# Patient Record
Sex: MALE | Race: WHITE | NOT HISPANIC OR LATINO | Employment: OTHER | URBAN - METROPOLITAN AREA
[De-identification: names, ages, dates, MRNs, and addresses within clinical notes are randomized per-mention and may not be internally consistent; named-entity substitution may affect disease eponyms.]

---

## 2017-02-10 ENCOUNTER — LAB (OUTPATIENT)
Dept: LAB | Facility: HOSPITAL | Age: 62
End: 2017-02-10

## 2017-02-10 DIAGNOSIS — E78.2 MIXED HYPERLIPIDEMIA: ICD-10-CM

## 2017-02-10 DIAGNOSIS — E11.8 DIABETIC COMPLICATION (HCC): Primary | ICD-10-CM

## 2017-02-10 DIAGNOSIS — I10 ESSENTIAL HYPERTENSION, MALIGNANT: ICD-10-CM

## 2017-02-10 LAB
ALBUMIN SERPL-MCNC: 4.2 G/DL (ref 3.2–4.8)
ALBUMIN/GLOB SERPL: 1.8 G/DL (ref 1.5–2.5)
ALP SERPL-CCNC: 70 U/L (ref 25–100)
ALT SERPL W P-5'-P-CCNC: 31 U/L (ref 7–40)
ANION GAP SERPL CALCULATED.3IONS-SCNC: 9 MMOL/L (ref 3–11)
ARTICHOKE IGE QN: 59 MG/DL (ref 0–130)
AST SERPL-CCNC: 26 U/L (ref 0–33)
BILIRUB SERPL-MCNC: 0.5 MG/DL (ref 0.3–1.2)
BUN BLD-MCNC: 15 MG/DL (ref 9–23)
BUN/CREAT SERPL: 18.8 (ref 7–25)
CALCIUM SPEC-SCNC: 9.4 MG/DL (ref 8.7–10.4)
CHLORIDE SERPL-SCNC: 104 MMOL/L (ref 99–109)
CHOLEST SERPL-MCNC: 166 MG/DL (ref 0–200)
CO2 SERPL-SCNC: 26 MMOL/L (ref 20–31)
CREAT BLD-MCNC: 0.8 MG/DL (ref 0.6–1.3)
GFR SERPL CREATININE-BSD FRML MDRD: 98 ML/MIN/1.73
GLOBULIN UR ELPH-MCNC: 2.3 GM/DL
GLUCOSE BLD-MCNC: 154 MG/DL (ref 70–100)
HBA1C MFR BLD: 7.4 % (ref 4.8–5.6)
HDLC SERPL-MCNC: 28 MG/DL (ref 40–60)
POTASSIUM BLD-SCNC: 4.5 MMOL/L (ref 3.5–5.5)
PROT SERPL-MCNC: 6.5 G/DL (ref 5.7–8.2)
SODIUM BLD-SCNC: 139 MMOL/L (ref 132–146)
TRIGL SERPL-MCNC: 334 MG/DL (ref 0–150)

## 2017-02-10 PROCEDURE — 80053 COMPREHEN METABOLIC PANEL: CPT | Performed by: INTERNAL MEDICINE

## 2017-02-10 PROCEDURE — 83036 HEMOGLOBIN GLYCOSYLATED A1C: CPT | Performed by: INTERNAL MEDICINE

## 2017-02-10 PROCEDURE — 36415 COLL VENOUS BLD VENIPUNCTURE: CPT

## 2017-02-10 PROCEDURE — 80061 LIPID PANEL: CPT | Performed by: INTERNAL MEDICINE

## 2019-05-31 ENCOUNTER — LAB REQUISITION (OUTPATIENT)
Dept: LAB | Facility: HOSPITAL | Age: 64
End: 2019-05-31

## 2019-05-31 ENCOUNTER — OFFICE VISIT (OUTPATIENT)
Dept: NEUROLOGY | Facility: CLINIC | Age: 64
End: 2019-05-31

## 2019-05-31 VITALS
BODY MASS INDEX: 41.75 KG/M2 | HEIGHT: 73 IN | WEIGHT: 315 LBS | OXYGEN SATURATION: 98 % | HEART RATE: 70 BPM | DIASTOLIC BLOOD PRESSURE: 80 MMHG | SYSTOLIC BLOOD PRESSURE: 140 MMHG

## 2019-05-31 DIAGNOSIS — R20.2 PARESTHESIA OF SKIN: ICD-10-CM

## 2019-05-31 DIAGNOSIS — R20.2 NUMBNESS AND TINGLING IN BOTH HANDS: Primary | ICD-10-CM

## 2019-05-31 DIAGNOSIS — R20.0 ANESTHESIA OF SKIN: ICD-10-CM

## 2019-05-31 DIAGNOSIS — R20.0 NUMBNESS AND TINGLING IN BOTH HANDS: Primary | ICD-10-CM

## 2019-05-31 PROCEDURE — 36415 COLL VENOUS BLD VENIPUNCTURE: CPT | Performed by: NURSE PRACTITIONER

## 2019-05-31 PROCEDURE — 99203 OFFICE O/P NEW LOW 30 MIN: CPT | Performed by: NURSE PRACTITIONER

## 2019-05-31 RX ORDER — ASPIRIN 81 MG/1
81 TABLET ORAL DAILY
COMMUNITY
End: 2021-03-01

## 2019-05-31 RX ORDER — LISINOPRIL 5 MG/1
5 TABLET ORAL DAILY
Refills: 2 | COMMUNITY
Start: 2019-04-03

## 2019-05-31 RX ORDER — ALLOPURINOL 300 MG/1
300 TABLET ORAL DAILY
Refills: 0 | COMMUNITY
Start: 2019-04-03

## 2019-05-31 RX ORDER — PIOGLITAZONEHYDROCHLORIDE 30 MG/1
30 TABLET ORAL DAILY
Refills: 2 | Status: ON HOLD | COMMUNITY
Start: 2019-04-03 | End: 2021-04-28

## 2019-05-31 RX ORDER — BISOPROLOL FUMARATE 5 MG/1
5 TABLET, FILM COATED ORAL DAILY
Refills: 2 | COMMUNITY
Start: 2019-04-03

## 2019-05-31 RX ORDER — GLYBURIDE 5 MG/1
10 TABLET ORAL 2 TIMES DAILY
Refills: 1 | Status: ON HOLD | COMMUNITY
Start: 2019-03-18 | End: 2021-04-28

## 2019-05-31 RX ORDER — INSULIN ASPART 100 [IU]/ML
INJECTION, SOLUTION INTRAVENOUS; SUBCUTANEOUS
Refills: 2 | COMMUNITY
Start: 2019-04-03

## 2019-05-31 RX ORDER — GABAPENTIN 100 MG/1
CAPSULE ORAL
Qty: 90 CAPSULE | Refills: 2 | Status: SHIPPED | OUTPATIENT
Start: 2019-05-31 | End: 2019-08-16

## 2019-05-31 RX ORDER — INSULIN DETEMIR 100 [IU]/ML
INJECTION, SOLUTION SUBCUTANEOUS 2 TIMES DAILY
Refills: 2 | COMMUNITY
Start: 2019-04-02

## 2019-05-31 NOTE — PROGRESS NOTES
Subjective:     Patient ID: Agusto Cullen is a 63 y.o. male.    CC:   Chief Complaint   Patient presents with   • Peripheral Neuropathy       HPI:   History of Present Illness   Is a very pleasant 63-year-old male who presents for initial neurological evaluation of bilateral hand pain, numbness, tingling and burning sensation over the past 12 months.  He tells me that his hands burn pretty much constantly with a numb and tingling sensation and this worsens with extra usage of the hands.  He tells me that it hurts more when he is squeezing his fishing remington and its increased in the right versus the left but it is both hands.  He is right-hand dominant.  He tells me that sometimes his pain will be severe throughout the night and he will have to get up and walk due to the discomfort.  He tells me that he has started wearing white socks on his hands and holding a bar of soap in each hand at night since his wife likes to do homeopathic remedies and he says this has actually helped.  He is also taking a nerve for new capsule at B1, B12 and alpha lipoic acid 2 times a day.  He is also using a frankincense and myrrh essential oil to put on the hands for neuropathy with some immediate relief but it does not sustain him.  He tells me he is moderately to severely uncomfortable at times.  He would like additional testing as well as something to take for the discomfort.  He has been a diabetic for 25 years.  Most recent hemoglobin A1c is 5.6%. Has gained about 100lbs since being on insulin. Trying to exercise but notices hypoglycemia with any significant physical exertion. He is currently on Levemir, NovoLog, pioglitazone and glyburide.  He has previously had uncontrolled diabetes but has been better controlled in the last few years.  He does feel like he is losing some fine motor control in his hands.  His numbness and tingling is mostly in the first 3 fingers of both hands.  He does have a history of gout but this is typically  in his right ankle and has not been an issue with maintenance of allopurinol.  He did have a recent CBC with differential and CMP as well as lipid panel and these essentially look within normal range with a mild elevation in triglycerides which has significantly improved over time per patient. Denies any paresthesias of legs or feet.    The following portions of the patient's history were reviewed and updated as appropriate: allergies, current medications, past family history, past medical history, past social history, past surgical history and problem list.    Past Medical History:   Diagnosis Date   • Diabetes mellitus (CMS/HCC)    • Gout    • Hyperlipidemia    • Hypertension    • Sleep apnea    • Vision loss        Past Surgical History:   Procedure Laterality Date   • CAROTID STENT     • CHOLECYSTECTOMY     • MANDIBLE SURGERY         Social History     Socioeconomic History   • Marital status:      Spouse name: Not on file   • Number of children: Not on file   • Years of education: Not on file   • Highest education level: Not on file   Tobacco Use   • Smoking status: Unknown If Ever Smoked   Substance and Sexual Activity   • Alcohol use: Defer   • Drug use: Defer   • Sexual activity: Defer       Family History   Problem Relation Age of Onset   • Diabetes Mother    • Cancer Mother    • Heart disease Brother    • Dementia Maternal Uncle    • Heart disease Maternal Grandfather         Review of Systems   Constitutional: Negative for chills, fatigue, fever and unexpected weight change.   HENT: Negative for ear pain, hearing loss, nosebleeds, rhinorrhea and sore throat.    Eyes: Negative for photophobia, pain, discharge, itching and visual disturbance.   Respiratory: Negative for cough, chest tightness, shortness of breath and wheezing.    Cardiovascular: Negative for chest pain, palpitations and leg swelling.   Gastrointestinal: Negative for abdominal pain, blood in stool, constipation, diarrhea, nausea and  vomiting.   Genitourinary: Negative for dysuria, frequency, hematuria and urgency.   Musculoskeletal: Negative for arthralgias, back pain, gait problem, joint swelling, myalgias, neck pain and neck stiffness.   Skin: Negative for rash and wound.   Allergic/Immunologic: Negative for environmental allergies and food allergies.   Neurological: Positive for numbness (in hands). Negative for dizziness, tremors, seizures, syncope, speech difficulty, weakness, light-headedness and headaches.   Hematological: Negative for adenopathy. Does not bruise/bleed easily.   Psychiatric/Behavioral: Negative for agitation, confusion, decreased concentration, hallucinations, sleep disturbance and suicidal ideas. The patient is not nervous/anxious.         Objective:    Neurologic Exam     Mental Status   Oriented to person, place, and time.   Registration: recalls 3 of 3 objects. Recall at 5 minutes: recalls 3 of 3 objects. Follows 3 step commands.   Attention: normal. Concentration: normal.   Speech: speech is normal   Level of consciousness: alert  Knowledge: good and consistent with education. Able to perform simple calculations.   Able to name object. Able to read. Able to repeat. Able to write. Normal comprehension.     Cranial Nerves   Cranial nerves II through XII intact.     Motor Exam   Muscle bulk: normal  Overall muscle tone: normal    Strength   Strength 5/5 throughout.     Sensory Exam   Light touch normal.   Vibration normal.   Proprioception normal.   Pinprick normal.     Decreased glove sensation bilateral hands. Negative tinel's and phalen's bilateral wrists and elbows     Gait, Coordination, and Reflexes     Gait  Gait: normal    Coordination   Romberg: negative  Finger to nose coordination: normal  Heel to shin coordination: normal    Tremor   Resting tremor: absent  Intention tremor: absent  Action tremor: absent    Reflexes   Right brachioradialis: 2+  Left brachioradialis: 2+  Right biceps: 2+  Left biceps:  2+  Right triceps: 2+  Left triceps: 2+  Right patellar: 2+  Left patellar: 2+  Right achilles: 2+  Left achilles: 2+  Right : 2+  Left : 2+  Right plantar: normal  Left plantar: normal  Right Santos: absent  Left Santos: absent  Right ankle clonus: absent  Left ankle clonus: absent      Physical Exam   Constitutional: He is oriented to person, place, and time. He appears well-developed and well-nourished.   Eyes:   Fundoscopic exam:       The right eye shows red reflex.        The left eye shows red reflex.   Cardiovascular: Normal rate, regular rhythm, S1 normal, S2 normal and normal heart sounds.   Pulmonary/Chest: Effort normal and breath sounds normal.   Neurological: He is oriented to person, place, and time. He has normal strength. He has a normal Finger-Nose-Finger Test, a normal Heel to Braswell Test and a normal Romberg Test. Gait normal.   Reflex Scores:       Tricep reflexes are 2+ on the right side and 2+ on the left side.       Bicep reflexes are 2+ on the right side and 2+ on the left side.       Brachioradialis reflexes are 2+ on the right side and 2+ on the left side.       Patellar reflexes are 2+ on the right side and 2+ on the left side.       Achilles reflexes are 2+ on the right side and 2+ on the left side.  Skin: Skin is warm, dry and intact. No lesion and no rash noted.   Psychiatric: He has a normal mood and affect. His speech is normal and behavior is normal. Judgment and thought content normal. Cognition and memory are normal.       Assessment/Plan:       Agusto was seen today for peripheral neuropathy.    Diagnoses and all orders for this visit:    Numbness and tingling in both hands  -     EMG & Nerve Conduction Test; Future  -     Ambulatory Referral to Occupational Therapy  -     Vitamin B12 & Folate; Future  -     Methylmalonic Acid, Serum; Future  -     MIGUEL + PE; Future  -     CK Total & CKMB; Future  -     gabapentin (NEURONTIN) 100 MG capsule; 1 capsule in AM and 2 capsules  in PM         Labs today. EMG/NCVS-suspect diabetic neuropathy early versus CTS or combination although negative phalens and tinels on exam. Start low dose gabapentin for discomfort. F/u in 8 weeks for re-eval. Reviewed medications, potential side effects and signs and symptoms to report. Discussed risk versus benefits of treatment plan with patient and/or family-including medications, labs and radiology that may be ordered. Addressed questions and concerns during visit. Patient and/or family verbalized understanding and agree with plan.    As part of this patient's treatment plan I am prescribing controlled substances. The patient has been made aware of appropriate use of such medications, including potential risk of somnolence, limited ability to drive and/or work safely, and potential for dependence or overdose. It has also been made clear that these medications are for use by the patient only, without concomitant use of alcohol or other substances unless prescribed. Keep out of reach of children.  Jose report has been reviewed. If this is going to be prescribed long term, [facility] Controlled Substance Agreement Contract has also been read and signed by patient and myself.  oJse #[id number] reviewed.    During this visit the following were done:  Labs Reviewed [x]    Labs Ordered [x]    Radiology Reports Reviewed []    Radiology Ordered []    PCP Records Reviewed [x]    Referring Provider Records Reviewed [x]    ER Records Reviewed []    Hospital Records Reviewed []    History Obtained From Family []    Radiology Images Reviewed []    Other Reviewed [x]    Records Requested []      EMR Dragon/Transcription Disclaimer:  Much of this encounter note is an electronic transcription of spoken language to printed text. Electronic transcription of spoken language may permit erroneous words or phrases to be inadvertently transcribed. Although I have reviewed the note for such errors, some may still exist in this  documentation.      Ann Raymundo, APRN  5/31/2019

## 2019-06-05 ENCOUNTER — TELEPHONE (OUTPATIENT)
Dept: NEUROLOGY | Facility: CLINIC | Age: 64
End: 2019-06-05

## 2019-06-05 NOTE — TELEPHONE ENCOUNTER
----- Message from YE Delgado sent at 6/5/2019  8:03 AM EDT -----  Blood work looks excellent. Vitamin B12 a bit high, but otherwise everything looks fine. Please fax labs to PCP. Thanks, YE Diamond

## 2019-07-09 ENCOUNTER — HOSPITAL ENCOUNTER (OUTPATIENT)
Dept: NEUROLOGY | Facility: HOSPITAL | Age: 64
Discharge: HOME | End: 2019-07-09
Admitting: NURSE PRACTITIONER

## 2019-07-09 DIAGNOSIS — R20.2 NUMBNESS AND TINGLING IN BOTH HANDS: ICD-10-CM

## 2019-07-09 DIAGNOSIS — R20.0 NUMBNESS AND TINGLING IN BOTH HANDS: ICD-10-CM

## 2019-07-09 PROCEDURE — 95910 NRV CNDJ TEST 7-8 STUDIES: CPT

## 2019-07-09 PROCEDURE — 95886 MUSC TEST DONE W/N TEST COMP: CPT

## 2019-07-10 ENCOUNTER — TELEPHONE (OUTPATIENT)
Dept: NEUROLOGY | Facility: CLINIC | Age: 64
End: 2019-07-10

## 2019-07-10 NOTE — TELEPHONE ENCOUNTER
----- Message from YE Delgado sent at 7/10/2019  1:24 PM EDT -----  Please notify patient emg/ncvs of both arms shows severe carpal tunnel on the right side and moderate on the left side-recommend referral to orthopedic hand surgeon-would he like to be referred? If So I will place the orders. Thanks, YE Diamond

## 2019-07-10 NOTE — PROGRESS NOTES
Please notify patient emg/ncvs of both arms shows severe carpal tunnel on the right side and moderate on the left side-recommend referral to orthopedic hand surgeon-would he like to be referred? If So I will place the orders. Thanks, YE Diamond

## 2019-07-11 ENCOUNTER — TELEPHONE (OUTPATIENT)
Dept: NEUROLOGY | Facility: CLINIC | Age: 64
End: 2019-07-11

## 2019-07-11 DIAGNOSIS — G56.03 BILATERAL CARPAL TUNNEL SYNDROME: Primary | ICD-10-CM

## 2019-07-19 ENCOUNTER — TELEPHONE (OUTPATIENT)
Dept: NEUROLOGY | Facility: CLINIC | Age: 64
End: 2019-07-19

## 2019-07-19 NOTE — TELEPHONE ENCOUNTER
Pt called and said he had his EMG and results were carpel tunnel and his is now scheduled on 7- with Orthopedic surgery and wanted to push his appt back so I rescheduled him and he also wanted to know if he should stop taking the gabapentin because he can't tell it has helped at all?

## 2019-07-19 NOTE — TELEPHONE ENCOUNTER
Yes, he can lower his gabapentin to 2 pills a day for a week and then 1 pill a day and then completely stop and keep appt with ortho. Thanks.

## 2019-08-16 ENCOUNTER — OFFICE VISIT (OUTPATIENT)
Dept: NEUROLOGY | Facility: CLINIC | Age: 64
End: 2019-08-16

## 2019-08-16 VITALS
DIASTOLIC BLOOD PRESSURE: 80 MMHG | HEIGHT: 73 IN | SYSTOLIC BLOOD PRESSURE: 138 MMHG | BODY MASS INDEX: 41.75 KG/M2 | HEART RATE: 80 BPM | OXYGEN SATURATION: 99 % | WEIGHT: 315 LBS

## 2019-08-16 DIAGNOSIS — G56.03 BILATERAL CARPAL TUNNEL SYNDROME: Primary | ICD-10-CM

## 2019-08-16 DIAGNOSIS — G56.23 ULNAR NEUROPATHY OF BOTH UPPER EXTREMITIES: ICD-10-CM

## 2019-08-16 PROCEDURE — 99213 OFFICE O/P EST LOW 20 MIN: CPT | Performed by: NURSE PRACTITIONER

## 2019-08-16 RX ORDER — MULTIVITAMIN WITH IRON
100 TABLET ORAL DAILY
COMMUNITY

## 2019-08-16 NOTE — PROGRESS NOTES
Subjective:     Patient ID: Agusto Cullen is a 64 y.o. male.    CC:   Chief Complaint   Patient presents with   • Carpal Tunnel       HPI:   History of Present Illness   This is a very pleasant 64-year-old male who presents for 2-1/2-month follow-up on confirmed bilateral carpal tunnel syndrome as well as ulnar neuropathy.  Last visit he had significant numbness and tingling in the first 3 fingers of both hands and also had a constant numbness and tingling sensation and burning for the past 12 months or so.  He had tried multiple homeopathic remedies as well as supplements over-the-counter with some mild relief.  He was moderately to severely uncomfortable at times and this was waking him up in the middle the night.  He has been diabetic for about 25 years.  Hemoglobin A1c recently 5.6%.  He did have an EMG and NCV as completed on 7/9/2019 and this confirmed a bilateral carpal tunnel syndrome severe on the right and moderate on the left as well as bilateral ulnar neuropathy of elbows moderate on the right and mild to moderate on the left.  He was evaluated by Sean Avitia PA-C with [facility] and was given injections a couple weeks ago and he tells me he has not seen much of a change yet.  He is wearing his splints.  He is considering surgical intervention once he has finished moving locally.  He did use gabapentin but did not feel like this was beneficial and so he has weaned off of this.  He does have some decreased strength in his hands.  Otherwise no new concerns.  He is planning on following up with orthopedics and just a few weeks..    The following portions of the patient's history were reviewed and updated as appropriate: allergies, current medications, past family history, past medical history, past social history, past surgical history and problem list.    Past Medical History:   Diagnosis Date   • Diabetes mellitus (CMS/[facility])    • Gout    • Hyperlipidemia    • Hypertension    •  Sleep apnea    • Vision loss        Past Surgical History:   Procedure Laterality Date   • CAROTID STENT     • CHOLECYSTECTOMY     • MANDIBLE SURGERY         Social History     Socioeconomic History   • Marital status:      Spouse name: Not on file   • Number of children: Not on file   • Years of education: Not on file   • Highest education level: Not on file   Tobacco Use   • Smoking status: Unknown If Ever Smoked   Substance and Sexual Activity   • Alcohol use: Defer   • Drug use: Defer   • Sexual activity: Defer       Family History   Problem Relation Age of Onset   • Diabetes Mother    • Cancer Mother    • Heart disease Brother    • Dementia Maternal Uncle    • Heart disease Maternal Grandfather         Review of Systems   Constitutional: Negative for chills, fatigue, fever and unexpected weight change.   HENT: Negative for ear pain, hearing loss, nosebleeds, rhinorrhea and sore throat.    Eyes: Negative for photophobia, pain, discharge, itching and visual disturbance.   Respiratory: Negative for cough, chest tightness, shortness of breath and wheezing.    Cardiovascular: Negative for chest pain, palpitations and leg swelling.   Gastrointestinal: Negative for abdominal pain, blood in stool, constipation, diarrhea, nausea and vomiting.   Genitourinary: Negative for dysuria, frequency, hematuria and urgency.   Musculoskeletal: Negative for arthralgias, back pain, gait problem, joint swelling, myalgias, neck pain and neck stiffness.   Skin: Negative for rash and wound.   Allergic/Immunologic: Negative for environmental allergies and food allergies.   Neurological: Positive for numbness. Negative for dizziness, tremors, seizures, syncope, speech difficulty, weakness, light-headedness and headaches.        Numbness in both hands   Hematological: Negative for adenopathy. Does not bruise/bleed easily.   Psychiatric/Behavioral: Negative for agitation, confusion, decreased concentration, hallucinations, sleep  disturbance and suicidal ideas. The patient is not nervous/anxious.    All other systems reviewed and are negative.       Objective:    Neurologic Exam     Mental Status   Oriented to person, place, and time.   Level of consciousness: alert    Cranial Nerves   Cranial nerves II through XII intact.     Motor Exam   Muscle bulk: normal  Overall muscle tone: normal    Strength   Strength 5/5 throughout.     Sensory Exam   Decreased glove sensation bilateral hands. Negative tinel's and phalen's bilateral wrists and elbows. wearing wrist splints today.     Gait, Coordination, and Reflexes     Gait  Gait: normal    Coordination   Finger to nose coordination: normal    Tremor   Resting tremor: absent  Intention tremor: absent  Action tremor: absent    Reflexes   Right brachioradialis: 1+  Left brachioradialis: 1+  Right biceps: 1+  Left biceps: 1+  Right triceps: 1+  Left triceps: 1+  Right : 1+  Left : 1+      Physical Exam   Constitutional: He is oriented to person, place, and time.   Neurological: He is oriented to person, place, and time. He has normal strength. He has a normal Finger-Nose-Finger Test. Gait normal.   Reflex Scores:       Tricep reflexes are 1+ on the right side and 1+ on the left side.       Bicep reflexes are 1+ on the right side and 1+ on the left side.       Brachioradialis reflexes are 1+ on the right side and 1+ on the left side.      Assessment/Plan:       Agusto was seen today for carpal tunnel.    Diagnoses and all orders for this visit:    Bilateral carpal tunnel syndrome  Comments:  continue with wrist splints. received injections. considering surgery with ortho. f/u with neurology PRN. off gabapentin-not helpful.    Ulnar neuropathy of both upper extremities  Comments:  continue with wrist splints. received injections. considering surgery with ortho. f/u with neurology PRN. off gabapentin-not helpful.         F/U with Neurology PRN. Reviewed [facility] notes/records.  Reviewed medications, potential side effects and signs and symptoms to report. Discussed risk versus benefits of treatment plan with patient and/or family-including medications, labs and radiology that may be ordered. Addressed questions and concerns during visit. Patient and/or family verbalized understanding and agree with plan.    During this visit the following were done:  Labs Reviewed [x] labs from last visit included vitamin B12 1659, folate greater than 20, methylmalonic acid 143, immunofixation and protein electrophoresis within normal limits and CK was CK-MB normal.  Labs Ordered []    Radiology Reports Reviewed []    Radiology Ordered []    PCP Records Reviewed []    Referring Provider Records Reviewed []    ER Records Reviewed []    Hospital Records Reviewed []    History Obtained From Family []    Radiology Images Reviewed []    Other Reviewed [x]    Records Requested []          Ann Raymundo, YE  8/16/2019

## 2021-02-25 PROBLEM — E78.5 HYPERLIPIDEMIA LDL GOAL <70: Status: ACTIVE | Noted: 2021-02-25

## 2021-02-25 PROBLEM — E66.01 MORBID OBESITY WITH BMI OF 45.0-49.9, ADULT (HCC): Status: ACTIVE | Noted: 2021-02-25

## 2021-02-25 PROBLEM — E11.65 TYPE 2 DIABETES MELLITUS WITH HYPERGLYCEMIA, WITHOUT LONG-TERM CURRENT USE OF INSULIN: Status: ACTIVE | Noted: 2021-02-25

## 2021-02-25 PROBLEM — I10 ESSENTIAL HYPERTENSION: Status: ACTIVE | Noted: 2021-02-25

## 2021-02-25 PROBLEM — G47.33 OSA (OBSTRUCTIVE SLEEP APNEA): Status: ACTIVE | Noted: 2021-02-25

## 2021-02-25 PROBLEM — I25.708 CORONARY ARTERY DISEASE OF BYPASS GRAFT OF NATIVE HEART WITH STABLE ANGINA PECTORIS (HCC): Status: ACTIVE | Noted: 2021-02-25

## 2021-03-01 ENCOUNTER — CONSULT (OUTPATIENT)
Dept: CARDIOLOGY | Facility: CLINIC | Age: 66
End: 2021-03-01

## 2021-03-01 VITALS
BODY MASS INDEX: 41.75 KG/M2 | SYSTOLIC BLOOD PRESSURE: 142 MMHG | HEIGHT: 73 IN | HEART RATE: 76 BPM | OXYGEN SATURATION: 98 % | DIASTOLIC BLOOD PRESSURE: 82 MMHG | WEIGHT: 315 LBS

## 2021-03-01 DIAGNOSIS — E11.65 TYPE 2 DIABETES MELLITUS WITH HYPERGLYCEMIA, WITHOUT LONG-TERM CURRENT USE OF INSULIN (HCC): ICD-10-CM

## 2021-03-01 DIAGNOSIS — I25.118 CORONARY ARTERY DISEASE OF NATIVE ARTERY OF NATIVE HEART WITH STABLE ANGINA PECTORIS (HCC): ICD-10-CM

## 2021-03-01 DIAGNOSIS — E66.01 MORBID OBESITY WITH BMI OF 45.0-49.9, ADULT (HCC): ICD-10-CM

## 2021-03-01 DIAGNOSIS — R06.09 DOE (DYSPNEA ON EXERTION): ICD-10-CM

## 2021-03-01 DIAGNOSIS — G47.33 OSA (OBSTRUCTIVE SLEEP APNEA): ICD-10-CM

## 2021-03-01 DIAGNOSIS — I25.708 CORONARY ARTERY DISEASE OF BYPASS GRAFT OF NATIVE HEART WITH STABLE ANGINA PECTORIS (HCC): Primary | ICD-10-CM

## 2021-03-01 DIAGNOSIS — I10 ESSENTIAL HYPERTENSION: ICD-10-CM

## 2021-03-01 DIAGNOSIS — E78.5 HYPERLIPIDEMIA LDL GOAL <70: ICD-10-CM

## 2021-03-01 PROCEDURE — 93000 ELECTROCARDIOGRAM COMPLETE: CPT | Performed by: INTERNAL MEDICINE

## 2021-03-01 PROCEDURE — 99204 OFFICE O/P NEW MOD 45 MIN: CPT | Performed by: INTERNAL MEDICINE

## 2021-03-01 RX ORDER — ATORVASTATIN CALCIUM 40 MG/1
40 TABLET, FILM COATED ORAL DAILY
Qty: 90 TABLET | Refills: 3 | Status: SHIPPED | OUTPATIENT
Start: 2021-03-01 | End: 2022-04-04

## 2021-03-01 RX ORDER — ASPIRIN 325 MG
325 TABLET, DELAYED RELEASE (ENTERIC COATED) ORAL DAILY
COMMUNITY

## 2021-03-05 ENCOUNTER — TELEPHONE (OUTPATIENT)
Dept: CARDIOLOGY | Facility: CLINIC | Age: 66
End: 2021-03-05

## 2021-03-05 DIAGNOSIS — R07.9 CHEST PAIN, UNSPECIFIED TYPE: ICD-10-CM

## 2021-03-05 DIAGNOSIS — I25.118 CORONARY ARTERY DISEASE OF NATIVE ARTERY OF NATIVE HEART WITH STABLE ANGINA PECTORIS (HCC): Primary | ICD-10-CM

## 2021-03-05 DIAGNOSIS — R06.02 SOB (SHORTNESS OF BREATH): ICD-10-CM

## 2021-04-16 ENCOUNTER — HOSPITAL ENCOUNTER (OUTPATIENT)
Dept: CARDIOLOGY | Facility: HOSPITAL | Age: 66
Discharge: HOME | End: 2021-04-16

## 2021-04-16 ENCOUNTER — HOSPITAL ENCOUNTER (OUTPATIENT)
Dept: CARDIOLOGY | Facility: HOSPITAL | Age: 66
Discharge: HOME | End: 2021-04-16
Admitting: INTERNAL MEDICINE

## 2021-04-16 VITALS
SYSTOLIC BLOOD PRESSURE: 171 MMHG | DIASTOLIC BLOOD PRESSURE: 84 MMHG | HEIGHT: 73 IN | WEIGHT: 315 LBS | BODY MASS INDEX: 41.75 KG/M2

## 2021-04-16 VITALS — HEART RATE: 67 BPM

## 2021-04-16 DIAGNOSIS — I25.118 CORONARY ARTERY DISEASE OF NATIVE ARTERY OF NATIVE HEART WITH STABLE ANGINA PECTORIS (HCC): ICD-10-CM

## 2021-04-16 DIAGNOSIS — I25.708 CORONARY ARTERY DISEASE OF BYPASS GRAFT OF NATIVE HEART WITH STABLE ANGINA PECTORIS (HCC): ICD-10-CM

## 2021-04-16 DIAGNOSIS — R06.02 SOB (SHORTNESS OF BREATH): ICD-10-CM

## 2021-04-16 DIAGNOSIS — R06.09 DOE (DYSPNEA ON EXERTION): ICD-10-CM

## 2021-04-16 DIAGNOSIS — R07.9 CHEST PAIN, UNSPECIFIED TYPE: ICD-10-CM

## 2021-04-16 LAB
BH CV ECHO MEAS - AO MAX PG (FULL): 6.5 MMHG
BH CV ECHO MEAS - AO MAX PG: 12.1 MMHG
BH CV ECHO MEAS - AO MEAN PG (FULL): 3 MMHG
BH CV ECHO MEAS - AO MEAN PG: 6 MMHG
BH CV ECHO MEAS - AO ROOT AREA (BSA CORRECTED): 1.2
BH CV ECHO MEAS - AO ROOT AREA: 9.1 CM^2
BH CV ECHO MEAS - AO ROOT DIAM: 3.4 CM
BH CV ECHO MEAS - AO V2 MAX: 174 CM/SEC
BH CV ECHO MEAS - AO V2 MEAN: 113 CM/SEC
BH CV ECHO MEAS - AO V2 VTI: 35.9 CM
BH CV ECHO MEAS - AVA(I,A): 2.2 CM^2
BH CV ECHO MEAS - AVA(I,D): 2.2 CM^2
BH CV ECHO MEAS - AVA(V,A): 2.1 CM^2
BH CV ECHO MEAS - AVA(V,D): 2.1 CM^2
BH CV ECHO MEAS - BSA(HAYCOCK): 2.9 M^2
BH CV ECHO MEAS - BSA: 2.7 M^2
BH CV ECHO MEAS - BZI_BMI: 45.9 KILOGRAMS/M^2
BH CV ECHO MEAS - BZI_METRIC_HEIGHT: 185.4 CM
BH CV ECHO MEAS - BZI_METRIC_WEIGHT: 157.9 KG
BH CV ECHO MEAS - EDV(CUBED): 51.1 ML
BH CV ECHO MEAS - EDV(MOD-SP2): 114 ML
BH CV ECHO MEAS - EDV(MOD-SP4): 179 ML
BH CV ECHO MEAS - EDV(TEICH): 58.5 ML
BH CV ECHO MEAS - EF(CUBED): 63.1 %
BH CV ECHO MEAS - EF(MOD-BP): 62 %
BH CV ECHO MEAS - EF(MOD-SP2): 62.3 %
BH CV ECHO MEAS - EF(MOD-SP4): 65.9 %
BH CV ECHO MEAS - EF(TEICH): 55.5 %
BH CV ECHO MEAS - ESV(CUBED): 18.8 ML
BH CV ECHO MEAS - ESV(MOD-SP2): 43 ML
BH CV ECHO MEAS - ESV(MOD-SP4): 61 ML
BH CV ECHO MEAS - ESV(TEICH): 26 ML
BH CV ECHO MEAS - FS: 28.3 %
BH CV ECHO MEAS - IVS/LVPW: 1
BH CV ECHO MEAS - IVSD: 1.3 CM
BH CV ECHO MEAS - LA DIMENSION: 4.2 CM
BH CV ECHO MEAS - LA/AO: 1.2
BH CV ECHO MEAS - LAD MAJOR: 5.3 CM
BH CV ECHO MEAS - LAT PEAK E' VEL: 10.5 CM/SEC
BH CV ECHO MEAS - LATERAL E/E' RATIO: 7.1
BH CV ECHO MEAS - LV DIASTOLIC VOL/BSA (35-75): 65.7 ML/M^2
BH CV ECHO MEAS - LV MASS(C)D: 168.1 GRAMS
BH CV ECHO MEAS - LV MASS(C)DI: 61.7 GRAMS/M^2
BH CV ECHO MEAS - LV MAX PG: 5.6 MMHG
BH CV ECHO MEAS - LV MEAN PG: 3 MMHG
BH CV ECHO MEAS - LV SYSTOLIC VOL/BSA (12-30): 22.4 ML/M^2
BH CV ECHO MEAS - LV V1 MAX: 118 CM/SEC
BH CV ECHO MEAS - LV V1 MEAN: 78.7 CM/SEC
BH CV ECHO MEAS - LV V1 VTI: 24.9 CM
BH CV ECHO MEAS - LVIDD: 3.7 CM
BH CV ECHO MEAS - LVIDS: 2.7 CM
BH CV ECHO MEAS - LVLD AP2: 7.7 CM
BH CV ECHO MEAS - LVLD AP4: 8.2 CM
BH CV ECHO MEAS - LVLS AP2: 6.4 CM
BH CV ECHO MEAS - LVLS AP4: 7.3 CM
BH CV ECHO MEAS - LVOT AREA (M): 3.1 CM^2
BH CV ECHO MEAS - LVOT AREA: 3.1 CM^2
BH CV ECHO MEAS - LVOT DIAM: 2 CM
BH CV ECHO MEAS - LVPWD: 1.3 CM
BH CV ECHO MEAS - MED PEAK E' VEL: 9.5 CM/SEC
BH CV ECHO MEAS - MEDIAL E/E' RATIO: 7.9
BH CV ECHO MEAS - MV A MAX VEL: 87.8 CM/SEC
BH CV ECHO MEAS - MV DEC TIME: 0.22 SEC
BH CV ECHO MEAS - MV E MAX VEL: 74.7 CM/SEC
BH CV ECHO MEAS - MV E/A: 0.85
BH CV ECHO MEAS - MV MAX PG: 4.2 MMHG
BH CV ECHO MEAS - MV MEAN PG: 2 MMHG
BH CV ECHO MEAS - MV V2 MAX: 103 CM/SEC
BH CV ECHO MEAS - MV V2 MEAN: 66.9 CM/SEC
BH CV ECHO MEAS - MV V2 VTI: 33.1 CM
BH CV ECHO MEAS - MVA(VTI): 2.4 CM^2
BH CV ECHO MEAS - PA ACC SLOPE: 564 CM/SEC^2
BH CV ECHO MEAS - PA ACC TIME: 0.15 SEC
BH CV ECHO MEAS - PA MAX PG: 5 MMHG
BH CV ECHO MEAS - PA PR(ACCEL): 11.1 MMHG
BH CV ECHO MEAS - PA V2 MAX: 112 CM/SEC
BH CV ECHO MEAS - SI(AO): 119.7 ML/M^2
BH CV ECHO MEAS - SI(CUBED): 11.8 ML/M^2
BH CV ECHO MEAS - SI(LVOT): 28.7 ML/M^2
BH CV ECHO MEAS - SI(MOD-SP2): 26.1 ML/M^2
BH CV ECHO MEAS - SI(MOD-SP4): 43.3 ML/M^2
BH CV ECHO MEAS - SI(TEICH): 11.9 ML/M^2
BH CV ECHO MEAS - SV(AO): 325.9 ML
BH CV ECHO MEAS - SV(CUBED): 32.2 ML
BH CV ECHO MEAS - SV(LVOT): 78.2 ML
BH CV ECHO MEAS - SV(MOD-SP2): 71 ML
BH CV ECHO MEAS - SV(MOD-SP4): 118 ML
BH CV ECHO MEAS - SV(TEICH): 32.5 ML
BH CV ECHO MEAS - TAPSE (>1.6): 3.5 CM
BH CV ECHO MEASUREMENTS AVERAGE E/E' RATIO: 7.47
BH CV REST NUCLEAR ISOTOPE DOSE: 9.9 MCI
BH CV STRESS BP STAGE 2: NORMAL
BH CV STRESS BP STAGE 4: NORMAL
BH CV STRESS COMMENTS STAGE 1: NORMAL
BH CV STRESS DOSE REGADENOSON STAGE 1: 0.4
BH CV STRESS DURATION MIN STAGE 1: 1
BH CV STRESS DURATION MIN STAGE 2: 1
BH CV STRESS DURATION MIN STAGE 3: 1
BH CV STRESS DURATION MIN STAGE 4: 1
BH CV STRESS DURATION SEC STAGE 2: 0
BH CV STRESS HR STAGE 1: 71
BH CV STRESS HR STAGE 2: 93
BH CV STRESS HR STAGE 3: 86
BH CV STRESS HR STAGE 4: 84
BH CV STRESS NUCLEAR ISOTOPE DOSE: 33 MCI
BH CV STRESS O2 STAGE 1: 95
BH CV STRESS O2 STAGE 2: 97
BH CV STRESS O2 STAGE 3: 97
BH CV STRESS O2 STAGE 4: 97
BH CV STRESS PROTOCOL 1: NORMAL
BH CV STRESS RECOVERY BP: NORMAL MMHG
BH CV STRESS RECOVERY HR: 80 BPM
BH CV STRESS RECOVERY O2: 97 %
BH CV STRESS STAGE 1: 1
BH CV STRESS STAGE 2: 2
BH CV STRESS STAGE 3: 3
BH CV STRESS STAGE 4: 4
BH CV XLRA - RV BASE: 3.6 CM
BH CV XLRA - RV LENGTH: 7.9 CM
BH CV XLRA - RV MID: 2.4 CM
BH CV XLRA - TDI S': 16 CM/SEC
LEFT ATRIUM VOLUME INDEX: 25 ML/M^2
LEFT ATRIUM VOLUME: 68 ML
LV EF NUC BP: 57 %
MAXIMAL PREDICTED HEART RATE: 153 BPM
MAXIMAL PREDICTED HEART RATE: 153 BPM
PERCENT MAX PREDICTED HR: 61.44 %
STRESS BASELINE BP: NORMAL MMHG
STRESS BASELINE HR: 65 BPM
STRESS O2 SAT REST: 96 %
STRESS PERCENT HR: 72 %
STRESS POST ESTIMATED WORKLOAD: 1 METS
STRESS POST EXERCISE DUR MIN: 4 MIN
STRESS POST EXERCISE DUR SEC: 0 SEC
STRESS POST O2 SAT PEAK: 97 %
STRESS POST PEAK BP: NORMAL MMHG
STRESS POST PEAK HR: 94 BPM
STRESS TARGET HR: 130 BPM
STRESS TARGET HR: 130 BPM

## 2021-04-16 PROCEDURE — 93306 TTE W/DOPPLER COMPLETE: CPT

## 2021-04-16 PROCEDURE — 93017 CV STRESS TEST TRACING ONLY: CPT

## 2021-04-16 PROCEDURE — A9500 TC99M SESTAMIBI: HCPCS | Performed by: INTERNAL MEDICINE

## 2021-04-16 PROCEDURE — 25010000002 SULFUR HEXAFLUORIDE MICROSPH 60.7-25 MG RECONSTITUTED SUSPENSION: Performed by: INTERNAL MEDICINE

## 2021-04-16 PROCEDURE — 0 TECHNETIUM SESTAMIBI: Performed by: INTERNAL MEDICINE

## 2021-04-16 PROCEDURE — 78452 HT MUSCLE IMAGE SPECT MULT: CPT | Performed by: INTERNAL MEDICINE

## 2021-04-16 PROCEDURE — 78452 HT MUSCLE IMAGE SPECT MULT: CPT

## 2021-04-16 PROCEDURE — 93306 TTE W/DOPPLER COMPLETE: CPT | Performed by: INTERNAL MEDICINE

## 2021-04-16 PROCEDURE — 93018 CV STRESS TEST I&R ONLY: CPT | Performed by: INTERNAL MEDICINE

## 2021-04-16 PROCEDURE — 25010000002 REGADENOSON 0.4 MG/5ML SOLUTION: Performed by: INTERNAL MEDICINE

## 2021-04-16 RX ADMIN — TECHNETIUM TC 99M SESTAMIBI 1 DOSE: 1 INJECTION INTRAVENOUS at 09:45

## 2021-04-16 RX ADMIN — REGADENOSON 0.4 MG: 0.08 INJECTION, SOLUTION INTRAVENOUS at 11:18

## 2021-04-16 RX ADMIN — TECHNETIUM TC 99M SESTAMIBI 1 DOSE: 1 INJECTION INTRAVENOUS at 11:20

## 2021-04-16 RX ADMIN — SULFUR HEXAFLUORIDE 2 ML: KIT at 10:03

## 2021-04-19 ENCOUNTER — TELEPHONE (OUTPATIENT)
Dept: CARDIOLOGY | Facility: CLINIC | Age: 66
End: 2021-04-19

## 2021-04-19 DIAGNOSIS — I10 ESSENTIAL HYPERTENSION: ICD-10-CM

## 2021-04-19 DIAGNOSIS — I25.118 CORONARY ARTERY DISEASE OF NATIVE ARTERY OF NATIVE HEART WITH STABLE ANGINA PECTORIS (HCC): Primary | ICD-10-CM

## 2021-04-19 DIAGNOSIS — E11.65 TYPE 2 DIABETES MELLITUS WITH HYPERGLYCEMIA, WITHOUT LONG-TERM CURRENT USE OF INSULIN (HCC): ICD-10-CM

## 2021-04-19 DIAGNOSIS — E66.01 MORBID OBESITY WITH BMI OF 45.0-49.9, ADULT (HCC): ICD-10-CM

## 2021-04-19 DIAGNOSIS — E78.5 HYPERLIPIDEMIA LDL GOAL <70: ICD-10-CM

## 2021-04-19 DIAGNOSIS — I20.0 UNSTABLE ANGINA (HCC): ICD-10-CM

## 2021-04-19 NOTE — TELEPHONE ENCOUNTER
I called the patient and discussed his abnormal stress test results.  There is an area of possible ischemia at the apex and left circumflex region.  Given his known history of coronary disease and stents in the past with ongoing dyspnea which is anginal equivalent, I recommend cardiac catheterization.  After discussing all risk benefits and alternatives of this procedure with the patient and his wife by phone they are both agreeable to proceed.  We will schedule the cardiac catheterization.  Patient was instructed to avoid strenuous activity in the meantime and call 911 if any chest pain worsens.

## 2021-04-25 ENCOUNTER — APPOINTMENT (OUTPATIENT)
Dept: PREADMISSION TESTING | Facility: HOSPITAL | Age: 66
End: 2021-04-25

## 2021-04-25 DIAGNOSIS — E78.5 HYPERLIPIDEMIA LDL GOAL <70: ICD-10-CM

## 2021-04-25 DIAGNOSIS — I10 ESSENTIAL HYPERTENSION: ICD-10-CM

## 2021-04-25 PROCEDURE — 36415 COLL VENOUS BLD VENIPUNCTURE: CPT

## 2021-04-25 PROCEDURE — C9803 HOPD COVID-19 SPEC COLLECT: HCPCS

## 2021-04-25 PROCEDURE — U0004 COV-19 TEST NON-CDC HGH THRU: HCPCS

## 2021-04-26 ENCOUNTER — PREP FOR SURGERY (OUTPATIENT)
Dept: OTHER | Facility: HOSPITAL | Age: 66
End: 2021-04-26

## 2021-04-26 DIAGNOSIS — R94.39 ABNORMAL STRESS TEST: Primary | ICD-10-CM

## 2021-04-26 LAB — SARS-COV-2 RNA NOSE QL NAA+PROBE: NOT DETECTED

## 2021-04-26 RX ORDER — LIDOCAINE HYDROCHLORIDE 10 MG/ML
0.1 INJECTION, SOLUTION EPIDURAL; INFILTRATION; INTRACAUDAL; PERINEURAL ONCE AS NEEDED
Status: CANCELLED | OUTPATIENT
Start: 2021-04-26

## 2021-04-26 RX ORDER — ASPIRIN 81 MG/1
324 TABLET, CHEWABLE ORAL ONCE
Status: CANCELLED | OUTPATIENT
Start: 2021-04-26 | End: 2021-04-26

## 2021-04-26 RX ORDER — SODIUM CHLORIDE 9 MG/ML
3 INJECTION, SOLUTION INTRAVENOUS CONTINUOUS
Status: CANCELLED | OUTPATIENT
Start: 2021-04-26 | End: 2021-04-26

## 2021-04-26 RX ORDER — ASPIRIN 81 MG/1
81 TABLET ORAL DAILY
Status: CANCELLED | OUTPATIENT
Start: 2021-04-27

## 2021-04-26 RX ORDER — ONDANSETRON 2 MG/ML
4 INJECTION INTRAMUSCULAR; INTRAVENOUS EVERY 6 HOURS PRN
Status: CANCELLED | OUTPATIENT
Start: 2021-04-26

## 2021-04-26 RX ORDER — SODIUM CHLORIDE 0.9 % (FLUSH) 0.9 %
3 SYRINGE (ML) INJECTION EVERY 12 HOURS SCHEDULED
Status: CANCELLED | OUTPATIENT
Start: 2021-04-26

## 2021-04-26 RX ORDER — SODIUM CHLORIDE 0.9 % (FLUSH) 0.9 %
10 SYRINGE (ML) INJECTION AS NEEDED
Status: CANCELLED | OUTPATIENT
Start: 2021-04-26

## 2021-04-26 RX ORDER — NITROGLYCERIN 0.4 MG/1
0.4 TABLET SUBLINGUAL
Status: CANCELLED | OUTPATIENT
Start: 2021-04-26

## 2021-04-28 ENCOUNTER — HOSPITAL ENCOUNTER (OUTPATIENT)
Facility: HOSPITAL | Age: 66
Discharge: HOME | End: 2021-04-28
Attending: INTERNAL MEDICINE | Admitting: INTERNAL MEDICINE

## 2021-04-28 VITALS
TEMPERATURE: 97.7 F | SYSTOLIC BLOOD PRESSURE: 120 MMHG | BODY MASS INDEX: 41.75 KG/M2 | DIASTOLIC BLOOD PRESSURE: 60 MMHG | HEIGHT: 73 IN | RESPIRATION RATE: 16 BRPM | OXYGEN SATURATION: 97 % | HEART RATE: 63 BPM | WEIGHT: 315 LBS

## 2021-04-28 DIAGNOSIS — E66.01 MORBID OBESITY WITH BMI OF 45.0-49.9, ADULT (HCC): ICD-10-CM

## 2021-04-28 DIAGNOSIS — R94.39 ABNORMAL STRESS TEST: ICD-10-CM

## 2021-04-28 DIAGNOSIS — I10 ESSENTIAL HYPERTENSION: ICD-10-CM

## 2021-04-28 DIAGNOSIS — I20.0 UNSTABLE ANGINA (HCC): ICD-10-CM

## 2021-04-28 DIAGNOSIS — I25.118 CORONARY ARTERY DISEASE OF NATIVE ARTERY OF NATIVE HEART WITH STABLE ANGINA PECTORIS (HCC): ICD-10-CM

## 2021-04-28 DIAGNOSIS — E78.5 HYPERLIPIDEMIA LDL GOAL <70: ICD-10-CM

## 2021-04-28 DIAGNOSIS — E11.65 TYPE 2 DIABETES MELLITUS WITH HYPERGLYCEMIA, WITHOUT LONG-TERM CURRENT USE OF INSULIN (HCC): ICD-10-CM

## 2021-04-28 LAB
ANION GAP SERPL CALCULATED.3IONS-SCNC: 14 MMOL/L (ref 5–15)
BUN SERPL-MCNC: 16 MG/DL (ref 8–23)
BUN/CREAT SERPL: 15.2 (ref 7–25)
CALCIUM SPEC-SCNC: 8.5 MG/DL (ref 8.6–10.5)
CHLORIDE SERPL-SCNC: 103 MMOL/L (ref 98–107)
CHOLEST SERPL-MCNC: 80 MG/DL (ref 0–200)
CO2 SERPL-SCNC: 22 MMOL/L (ref 22–29)
CREAT SERPL-MCNC: 1.05 MG/DL (ref 0.76–1.27)
DEPRECATED RDW RBC AUTO: 46.7 FL (ref 37–54)
ERYTHROCYTE [DISTWIDTH] IN BLOOD BY AUTOMATED COUNT: 13.2 % (ref 12.3–15.4)
GFR SERPL CREATININE-BSD FRML MDRD: 70 ML/MIN/1.73
GLUCOSE SERPL-MCNC: 197 MG/DL (ref 65–99)
HBA1C MFR BLD: 6.1 % (ref 4.8–5.6)
HCT VFR BLD AUTO: 44.7 % (ref 37.5–51)
HDLC SERPL-MCNC: 32 MG/DL (ref 40–60)
HGB BLD-MCNC: 14.4 G/DL (ref 13–17.7)
LDLC SERPL CALC-MCNC: 28 MG/DL (ref 0–100)
LDLC/HDLC SERPL: 0.84 {RATIO}
MCH RBC QN AUTO: 31.6 PG (ref 26.6–33)
MCHC RBC AUTO-ENTMCNC: 32.2 G/DL (ref 31.5–35.7)
MCV RBC AUTO: 98.2 FL (ref 79–97)
PLATELET # BLD AUTO: 279 10*3/MM3 (ref 140–450)
PMV BLD AUTO: 10.5 FL (ref 6–12)
POTASSIUM SERPL-SCNC: 4.2 MMOL/L (ref 3.5–5.2)
RBC # BLD AUTO: 4.55 10*6/MM3 (ref 4.14–5.8)
SODIUM SERPL-SCNC: 139 MMOL/L (ref 136–145)
TRIGL SERPL-MCNC: 106 MG/DL (ref 0–150)
VLDLC SERPL-MCNC: 20 MG/DL (ref 5–40)
WBC # BLD AUTO: 9.82 10*3/MM3 (ref 3.4–10.8)

## 2021-04-28 PROCEDURE — 80061 LIPID PANEL: CPT | Performed by: NURSE PRACTITIONER

## 2021-04-28 PROCEDURE — 25010000002 MIDAZOLAM PER 1 MG: Performed by: INTERNAL MEDICINE

## 2021-04-28 PROCEDURE — 0 IOPAMIDOL PER 1 ML: Performed by: INTERNAL MEDICINE

## 2021-04-28 PROCEDURE — 83036 HEMOGLOBIN GLYCOSYLATED A1C: CPT | Performed by: NURSE PRACTITIONER

## 2021-04-28 PROCEDURE — C1894 INTRO/SHEATH, NON-LASER: HCPCS | Performed by: INTERNAL MEDICINE

## 2021-04-28 PROCEDURE — C1769 GUIDE WIRE: HCPCS | Performed by: INTERNAL MEDICINE

## 2021-04-28 PROCEDURE — 85027 COMPLETE CBC AUTOMATED: CPT | Performed by: NURSE PRACTITIONER

## 2021-04-28 PROCEDURE — 93458 L HRT ARTERY/VENTRICLE ANGIO: CPT | Performed by: INTERNAL MEDICINE

## 2021-04-28 PROCEDURE — 80048 BASIC METABOLIC PNL TOTAL CA: CPT | Performed by: NURSE PRACTITIONER

## 2021-04-28 PROCEDURE — 25010000002 FENTANYL CITRATE (PF) 100 MCG/2ML SOLUTION: Performed by: INTERNAL MEDICINE

## 2021-04-28 PROCEDURE — 25010000002 HEPARIN (PORCINE) PER 1000 UNITS: Performed by: INTERNAL MEDICINE

## 2021-04-28 RX ORDER — SODIUM CHLORIDE 9 MG/ML
1 INJECTION, SOLUTION INTRAVENOUS CONTINUOUS
Status: ACTIVE | OUTPATIENT
Start: 2021-04-28 | End: 2021-04-28

## 2021-04-28 RX ORDER — MIDAZOLAM HYDROCHLORIDE 1 MG/ML
INJECTION INTRAMUSCULAR; INTRAVENOUS AS NEEDED
Status: DISCONTINUED | OUTPATIENT
Start: 2021-04-28 | End: 2021-04-28 | Stop reason: HOSPADM

## 2021-04-28 RX ORDER — SODIUM CHLORIDE 9 MG/ML
3 INJECTION, SOLUTION INTRAVENOUS CONTINUOUS
Status: ACTIVE | OUTPATIENT
Start: 2021-04-28 | End: 2021-04-28

## 2021-04-28 RX ORDER — SODIUM CHLORIDE 0.9 % (FLUSH) 0.9 %
10 SYRINGE (ML) INJECTION AS NEEDED
Status: DISCONTINUED | OUTPATIENT
Start: 2021-04-28 | End: 2021-04-28 | Stop reason: HOSPADM

## 2021-04-28 RX ORDER — FENTANYL CITRATE 50 UG/ML
INJECTION, SOLUTION INTRAMUSCULAR; INTRAVENOUS AS NEEDED
Status: DISCONTINUED | OUTPATIENT
Start: 2021-04-28 | End: 2021-04-28 | Stop reason: HOSPADM

## 2021-04-28 RX ORDER — LIDOCAINE HYDROCHLORIDE 10 MG/ML
INJECTION, SOLUTION EPIDURAL; INFILTRATION; INTRACAUDAL; PERINEURAL AS NEEDED
Status: DISCONTINUED | OUTPATIENT
Start: 2021-04-28 | End: 2021-04-28 | Stop reason: HOSPADM

## 2021-04-28 RX ORDER — ASPIRIN 81 MG/1
324 TABLET, CHEWABLE ORAL ONCE
Status: COMPLETED | OUTPATIENT
Start: 2021-04-28 | End: 2021-04-28

## 2021-04-28 RX ORDER — NITROGLYCERIN 0.4 MG/1
0.4 TABLET SUBLINGUAL
Status: DISCONTINUED | OUTPATIENT
Start: 2021-04-28 | End: 2021-04-28 | Stop reason: HOSPADM

## 2021-04-28 RX ORDER — ASPIRIN 81 MG/1
81 TABLET ORAL DAILY
Status: DISCONTINUED | OUTPATIENT
Start: 2021-04-29 | End: 2021-04-28 | Stop reason: HOSPADM

## 2021-04-28 RX ORDER — ONDANSETRON 2 MG/ML
4 INJECTION INTRAMUSCULAR; INTRAVENOUS EVERY 6 HOURS PRN
Status: DISCONTINUED | OUTPATIENT
Start: 2021-04-28 | End: 2021-04-28 | Stop reason: HOSPADM

## 2021-04-28 RX ORDER — LIDOCAINE HYDROCHLORIDE 10 MG/ML
0.1 INJECTION, SOLUTION EPIDURAL; INFILTRATION; INTRACAUDAL; PERINEURAL ONCE AS NEEDED
Status: DISCONTINUED | OUTPATIENT
Start: 2021-04-28 | End: 2021-04-28 | Stop reason: HOSPADM

## 2021-04-28 RX ORDER — SODIUM CHLORIDE 0.9 % (FLUSH) 0.9 %
3 SYRINGE (ML) INJECTION EVERY 12 HOURS SCHEDULED
Status: DISCONTINUED | OUTPATIENT
Start: 2021-04-28 | End: 2021-04-28 | Stop reason: HOSPADM

## 2021-04-28 RX ADMIN — SODIUM CHLORIDE 3 ML/KG/HR: 9 INJECTION, SOLUTION INTRAVENOUS at 07:07

## 2021-04-28 RX ADMIN — ASPIRIN 324 MG: 81 TABLET, CHEWABLE ORAL at 07:07

## 2021-06-18 PROBLEM — R06.09 DOE (DYSPNEA ON EXERTION): Status: ACTIVE | Noted: 2021-06-18

## 2021-06-18 NOTE — PROGRESS NOTES
OFFICE VISIT  NOTE  [facility] CARDIOLOGY      Name: Agusto Cullen    Date: 2021  MRN:  [id number]  :  1955      REFERRING/PRIMARY PROVIDER:  Rachel Raymond MD    Chief Complaint   Patient presents with   • Coronary Artery Disease       HPI: Agusto Cullen is a 65 y.o. male who presents today for follow-up for history of CAD.  History of PCI of the mid RCA with a 4.0 mm stent by Dr. Nichols and , occluded distal circumflex with collaterals, 40% diagonal lesion, 50% PDA lesion.  Most recent cath 2021, showed known left circumflex , PLV , minimal disease of LAD and patent RCA stents.  He started on Trulicity after cath.  He has diabetes mellitus type 2, hypertension, hyperlipidemia, and super morbid obesity.  Less short of breath recently, has lost 30 pounds in the last 3 months.  Doing well with Trulicity.  Denies chest pain or shortness of breath currently.    Past Medical History:   Diagnosis Date   • Diabetes mellitus (CMS/[facility])    • Gout    • Hyperlipidemia    • Hypertension    • Sleep apnea    • Vision loss        Past Surgical History:   Procedure Laterality Date   • CARDIAC CATHETERIZATION Left 2021    Procedure: Left Heart Cath +/- CBI;  Surgeon: Damon Kelley MD;  Location: [facility] INVASIVE LOCATION;  Service: Cardiovascular;  Laterality: Left;   • CAROTID STENT     • CHOLECYSTECTOMY     • MANDIBLE SURGERY         Social History     Socioeconomic History   • Marital status:      Spouse name: Not on file   • Number of children: Not on file   • Years of education: Not on file   • Highest education level: Not on file   Tobacco Use   • Smoking status: Never Smoker   • Smokeless tobacco: Never Used   Substance and Sexual Activity   • Alcohol use: Yes     Comment: 3 glasses of wine per year   • Drug use: Never   • Sexual activity: Defer       Family History   Problem Relation Age of Onset   • Stroke Mother    • Heart disease Brother 49   •  Dementia Maternal Uncle    • Heart disease Maternal Grandfather    • Cancer Father         ROS:   Constitutional no fever,  no weight loss   Skin no rash, no subcutaneous nodules   Otolaryngeal no difficulty swallowing   Cardiovascular See HPI   Pulmonary no cough, no sputum production   Gastrointestinal no constipation, no diarrhea   Genitourinary no dysuria, no hematuria   Hematologic no easy bruisability, no abnormal bleeding   Musculoskeletal no muscle pain   Neurologic no dizziness, no falls         Allergies   Allergen Reactions   • Penicillins Other (See Comments)     unkown         Current Outpatient Medications:   •  allopurinol (ZYLOPRIM) 300 MG tablet, Take 300 mg by mouth Daily., Disp: , Rfl: 0  •  aspirin  MG tablet, Take 325 mg by mouth Daily., Disp: , Rfl:   •  atorvastatin (LIPITOR) 40 MG tablet, Take 1 tablet by mouth Daily., Disp: 90 tablet, Rfl: 3  •  bisoprolol (ZEBeta) 5 MG tablet, Take 5 mg by mouth Daily., Disp: , Rfl: 2  •  BLACK CURRANT SEED OIL PO, Take  by mouth., Disp: , Rfl:   •  CHONDROITIN SULFATE PO, Take 1,200 mg by mouth., Disp: , Rfl:   •  Fish Oil-Cholecalciferol (FISH OIL + D3 PO), Take 1,200 mg by mouth., Disp: , Rfl:   •  Glucosamine-Chondroit-Vit C-Mn (GLUCOSAMINE 1500 COMPLEX PO), Take  by mouth., Disp: , Rfl:   •  LEVEMIR FLEXTOUCH 100 UNIT/ML injection, 2 (Two) Times a Day. Sliding scale, Disp: , Rfl: 2  •  lisinopril (PRINIVIL,ZESTRIL) 20 MG tablet, Take 20 mg by mouth Daily., Disp: , Rfl: 2  •  metFORMIN (GLUCOPHAGE) 500 MG tablet, Take 500 mg by mouth 2 (Two) Times a Day With Meals. 2 tablets twice daily, Disp: , Rfl:   •  NOVOLOG FLEXPEN 100 UNIT/ML solution pen-injector sc pen, Sliding scale, Disp: , Rfl: 2  •  Trulicity 0.75 MG/0.5ML solution pen-injector, 1 (One) Time Per Week., Disp: , Rfl:   •  vitamin B-6 (PYRIDOXINE) 100 MG tablet, Take 100 mg by mouth Daily., Disp: , Rfl:   •  vitamin E 100 UNIT capsule, Take 100 Units by mouth Daily. otc per ortho hand  "specialist, Disp: , Rfl:     Vitals:    06/21/21 1147   BP: 126/66   BP Location: Right arm   Patient Position: Sitting   Pulse: 74   SpO2: 95%   Weight: (!) 144 kg (317 lb)   Height: 185.4 cm (73\")     Body mass index is 41.82 kg/m².    PHYSICAL EXAM:    General Appearance:   · well developed  · well nourished  HENT:   · oropharynx moist  · lips not cyanotic  Neck:  · thyroid not enlarged  · supple  Respiratory:  · no respiratory distress  · normal breath sounds  · no rales  Cardiovascular:  · no jugular venous distention  · regular rhythm  · apical impulse normal  · S1 normal, S2 normal  · no S3, no S4   · no murmur  · no rub, no thrill  · carotid pulses normal; no bruit  · pedal pulses normal  · lower extremity edema: none    Gastrointestinal:   · bowel sounds normal  · non-tender  · no hepatomegaly, no splenomegaly  Musculoskeletal:  · no clubbing of fingers.   · normocephalic, head atraumatic  Skin:   · warm, dry  Psychiatric:  · judgement and insight appropriate  · normal mood and affect    RESULTS:   Procedures    Results for orders placed during the hospital encounter of 04/16/21    Adult Transthoracic Echo Complete W/ Cont if Necessary Per Protocol    Interpretation Summary  · Left ventricular ejection fraction appears to be 61 - 65%. Left ventricular systolic function is normal.  · Left ventricular diastolic function was normal.        Labs:  CBC 2/24/21   WBC 9   RBC 4.56   Hemoglobin 14.6   Hematocrit 43.4   Platelet 280       CMP 2/24/21   Glucose 112 H   BUN 20   Creatinine 0.96   Glomerular Filtration Rate 81   BUN/Creatinine Ratio NA   Sodium 141   Potassium 4.4   Chloride 106   Carbon Dioxide 27   Calcium 9.1   AST 20   ALT 20       LIPID 2/24/21   Total 143   HDL 28 L   Triglycerides 170 H   LDL 88       Hemoglobin A1C 5.6       TSH 1.58   PSA 0.7         Lab Results   Component Value Date    CHOL 80 04/28/2021    TRIG 106 04/28/2021    HDL 32 (L) 04/28/2021    LDL 28 04/28/2021    AST 26 " 02/10/2017    ALT 31 02/10/2017     Lab Results   Component Value Date    HGBA1C 6.10 (H) 04/28/2021     No components found for: CREATINININE  eGFR Non  Amer   Date Value Ref Range Status   04/28/2021 70 >60 mL/min/1.73 Final   02/10/2017 98 >60 mL/min/1.73 Final       Most recent PCP note, imaging tests, and labs reviewed.    ASSESSMENT:  Problem List Items Addressed This Visit        Cardiac and Vasculature    Coronary artery disease of native artery of native heart with stable angina pectoris (CMS/[facility]) - Primary    Overview     2003: PCI of the mid RCA with a 4.0 mm stent by Dr. Nichols and 2003, occluded distal circumflex with collaterals, 40% diagonal lesion, 50% PDA lesion.  4/20/2021: [facility]: Patent mid RCA stent with minimal 10-20% ISR, small caliber PDA superior branch 100% , small caliber circumflex gives rise to 1 small caliber OM which is patent, mid/distal circumflex is 100% occluded with right to left collaterals.  This was known from previous cath in 2003, LAD moderate caliber, first diagonal moderate caliber first diagonal has ostial 40-50% stenosis, mid LAD and acute bend has approximately 30-40% stenosis otherwise normal.  LVEDP 2 mmHg.         Essential hypertension    Hyperlipidemia LDL goal <70    KRUEGER (dyspnea on exertion)    Overview     4/16/21 Stress pet small apical infarct with mild coby-infarct ischemia. Possible small region of mild ischemia in the distal left circumflex    4/16/21 Echo LVEF 61-65%, normal diastolic function            Endocrine and Metabolic    Type 2 diabetes mellitus with hyperglycemia, without long-term current use of insulin (CMS/[facility])    Relevant Medications    Trulicity 0.75 MG/0.5ML solution pen-injector    Morbid obesity with BMI of 45.0-49.9, adult (CMS/[facility])       Sleep    CHARLI (obstructive sleep apnea)          PLAN:    1.  Dyspnea on exertion:  Stress/PET 4/16/21 reviewed, abnormal with possible small region of mild ischemia in the distal left  circumflex  [facility] 4/20/21, reviewed films in person with patient, circumflex  and PLV , medical therapy for now.  No indication for high risk intervention.    Echocardiogram 4/16/21 reviewed, normal  The patient was advised to call 911 if chest pain worsens or persist despite nitroglycerin or rest.     2.  CAD:  Continue atorvastatin 40 mg daily  Lipids well controlled, LDL mid 30s  Continue aspirin and bisoprolol  Continue Trulicity    3.  Morbid obesity:  Discussed importance of weight loss and strategies  He has lost 30 pounds which is great.    4.  Hypertension:  Long-term goal blood pressure is 130/80  Continue lisinopril and bisoprolol    5.  Hyperlipidemia:  LDL goal less than 70  Doing well on atorvastatin 40 mg daily    Return to clinic in 9 months, or sooner as needed.    Thank you for the opportunity to share in the care of your patient; please do not hesitate to call me with any questions.     Damon Kelley MD, [facility]  Office: [phone] [address] [place] [address]    06/21/21

## 2021-06-21 ENCOUNTER — OFFICE VISIT (OUTPATIENT)
Dept: CARDIOLOGY | Facility: CLINIC | Age: 66
End: 2021-06-21

## 2021-06-21 VITALS
HEIGHT: 73 IN | OXYGEN SATURATION: 95 % | DIASTOLIC BLOOD PRESSURE: 66 MMHG | BODY MASS INDEX: 41.75 KG/M2 | WEIGHT: 315 LBS | SYSTOLIC BLOOD PRESSURE: 126 MMHG | HEART RATE: 74 BPM

## 2021-06-21 DIAGNOSIS — I10 ESSENTIAL HYPERTENSION: ICD-10-CM

## 2021-06-21 DIAGNOSIS — I25.118 CORONARY ARTERY DISEASE OF NATIVE ARTERY OF NATIVE HEART WITH STABLE ANGINA PECTORIS (HCC): Primary | ICD-10-CM

## 2021-06-21 DIAGNOSIS — E11.65 TYPE 2 DIABETES MELLITUS WITH HYPERGLYCEMIA, WITHOUT LONG-TERM CURRENT USE OF INSULIN (HCC): ICD-10-CM

## 2021-06-21 DIAGNOSIS — R06.09 DOE (DYSPNEA ON EXERTION): ICD-10-CM

## 2021-06-21 DIAGNOSIS — E66.01 MORBID OBESITY WITH BMI OF 45.0-49.9, ADULT (HCC): ICD-10-CM

## 2021-06-21 DIAGNOSIS — E78.5 HYPERLIPIDEMIA LDL GOAL <70: ICD-10-CM

## 2021-06-21 DIAGNOSIS — G47.33 OSA (OBSTRUCTIVE SLEEP APNEA): ICD-10-CM

## 2021-06-21 PROCEDURE — 99214 OFFICE O/P EST MOD 30 MIN: CPT | Performed by: INTERNAL MEDICINE

## 2021-06-21 RX ORDER — DULAGLUTIDE 0.75 MG/.5ML
INJECTION, SOLUTION SUBCUTANEOUS WEEKLY
COMMUNITY
Start: 2021-04-14 | End: 2022-04-04

## 2022-04-04 ENCOUNTER — OFFICE VISIT (OUTPATIENT)
Dept: CARDIOLOGY | Facility: CLINIC | Age: 67
End: 2022-04-04

## 2022-04-04 VITALS
DIASTOLIC BLOOD PRESSURE: 70 MMHG | BODY MASS INDEX: 37.91 KG/M2 | SYSTOLIC BLOOD PRESSURE: 142 MMHG | HEIGHT: 73 IN | WEIGHT: 286 LBS | OXYGEN SATURATION: 98 % | HEART RATE: 69 BPM

## 2022-04-04 DIAGNOSIS — I10 ESSENTIAL HYPERTENSION: ICD-10-CM

## 2022-04-04 DIAGNOSIS — E11.65 TYPE 2 DIABETES MELLITUS WITH HYPERGLYCEMIA, WITHOUT LONG-TERM CURRENT USE OF INSULIN: ICD-10-CM

## 2022-04-04 DIAGNOSIS — G47.33 OSA (OBSTRUCTIVE SLEEP APNEA): ICD-10-CM

## 2022-04-04 DIAGNOSIS — R06.09 DOE (DYSPNEA ON EXERTION): ICD-10-CM

## 2022-04-04 DIAGNOSIS — E78.5 HYPERLIPIDEMIA LDL GOAL <70: ICD-10-CM

## 2022-04-04 DIAGNOSIS — I25.118 CORONARY ARTERY DISEASE OF NATIVE ARTERY OF NATIVE HEART WITH STABLE ANGINA PECTORIS: Primary | ICD-10-CM

## 2022-04-04 PROCEDURE — 99214 OFFICE O/P EST MOD 30 MIN: CPT | Performed by: INTERNAL MEDICINE

## 2022-04-04 PROCEDURE — 93000 ELECTROCARDIOGRAM COMPLETE: CPT | Performed by: INTERNAL MEDICINE

## 2022-04-04 RX ORDER — ATORVASTATIN CALCIUM 10 MG/1
10 TABLET, FILM COATED ORAL DAILY
COMMUNITY
Start: 2022-02-01

## 2022-04-04 RX ORDER — DULAGLUTIDE 1.5 MG/.5ML
0.75 INJECTION, SOLUTION SUBCUTANEOUS
COMMUNITY
Start: 2022-02-02

## 2023-01-16 ENCOUNTER — OFFICE VISIT (OUTPATIENT)
Dept: CARDIOLOGY | Facility: CLINIC | Age: 68
End: 2023-01-16
Payer: MEDICARE

## 2023-01-16 VITALS
DIASTOLIC BLOOD PRESSURE: 68 MMHG | SYSTOLIC BLOOD PRESSURE: 122 MMHG | WEIGHT: 266 LBS | HEART RATE: 68 BPM | BODY MASS INDEX: 35.25 KG/M2 | HEIGHT: 73 IN | OXYGEN SATURATION: 98 %

## 2023-01-16 DIAGNOSIS — I25.118 CORONARY ARTERY DISEASE OF NATIVE ARTERY OF NATIVE HEART WITH STABLE ANGINA PECTORIS: Primary | ICD-10-CM

## 2023-01-16 DIAGNOSIS — I10 ESSENTIAL HYPERTENSION: ICD-10-CM

## 2023-01-16 DIAGNOSIS — E11.65 TYPE 2 DIABETES MELLITUS WITH HYPERGLYCEMIA, WITHOUT LONG-TERM CURRENT USE OF INSULIN: ICD-10-CM

## 2023-01-16 DIAGNOSIS — E78.5 HYPERLIPIDEMIA LDL GOAL <70: ICD-10-CM

## 2023-01-16 DIAGNOSIS — R06.09 DOE (DYSPNEA ON EXERTION): ICD-10-CM

## 2023-01-16 PROCEDURE — 99214 OFFICE O/P EST MOD 30 MIN: CPT | Performed by: INTERNAL MEDICINE

## 2023-01-16 RX ORDER — EMPAGLIFLOZIN 10 MG/1
10 TABLET, FILM COATED ORAL DAILY
COMMUNITY
Start: 2022-10-20

## 2023-01-16 NOTE — PROGRESS NOTES
OFFICE VISIT  NOTE  [facility] CARDIOLOGY [place] INT GEN      Name: Agusto Cullen    Date: 2023  MRN:  [id number]  :  1955      REFERRING/PRIMARY PROVIDER:  Rachel Raymond MD    Chief Complaint   Patient presents with   • Coronary artery disease of native artery of native heart wi       HPI: Agusto Cullen is a 67 y.o. male who presents today for follow-up for history of CAD.  History of PCI of the mid RCA with a 4.0 mm stent by Dr. Nichols and , occluded distal circumflex with collaterals, 40% diagonal lesion, 50% PDA lesion.  Most recent cath 2021, showed known left circumflex , PLV , minimal disease of LAD and patent RCA stents.  He started on Trulicity after cath.  He has diabetes mellitus type 2, hypertension, hyperlipidemia, and morbid obesity.  Doing well from cardiovascular standpoint, denies chest pain palpitations or shortness of breath.  Still exercising, riding an exercise bike 4 to 5 days per and staying active in his garden.  Has lost over 90 pounds over the past 2 years.  Excellent A1c and cholesterol numbers per patient last PCP visit.    Past Medical History:   Diagnosis Date   • Diabetes mellitus (HCC)    • Gout    • Hyperlipidemia    • Hypertension    • Sleep apnea    • Vision loss        Past Surgical History:   Procedure Laterality Date   • CARDIAC CATHETERIZATION Left 2021    Procedure: Left Heart Cath +/- CBI;  Surgeon: Damon Kelley MD;  [facility];  Service: Cardiovascular;  Laterality: Left;   • CAROTID STENT     • CHOLECYSTECTOMY     • MANDIBLE SURGERY         Social History     Socioeconomic History   • Marital status:    Tobacco Use   • Smoking status: Never   • Smokeless tobacco: Never   Vaping Use   • Vaping Use: Never used   Substance and Sexual Activity   • Alcohol use: Yes     Comment: 3 glasses of wine per year   • Drug use: Never   • Sexual activity: Defer       Family History    Problem Relation Age of Onset   • Stroke Mother    • Heart disease Brother 49   • Dementia Maternal Uncle    • Heart disease Maternal Grandfather    • Cancer Father         ROS:   Constitutional no fever,  no weight loss   Skin no rash, no subcutaneous nodules   Otolaryngeal no difficulty swallowing   Cardiovascular See HPI   Pulmonary no cough, no sputum production   Gastrointestinal no constipation, no diarrhea   Genitourinary no dysuria, no hematuria   Hematologic no easy bruisability, no abnormal bleeding   Musculoskeletal no muscle pain   Neurologic no dizziness, no falls         Allergies   Allergen Reactions   • Penicillins Unknown - Low Severity     unknown         Current Outpatient Medications:   •  allopurinol (ZYLOPRIM) 300 MG tablet, Take 300 mg by mouth Daily., Disp: , Rfl: 0  •  aspirin  MG tablet, Take 325 mg by mouth Daily., Disp: , Rfl:   •  atorvastatin (LIPITOR) 10 MG tablet, Take 10 mg by mouth Daily., Disp: , Rfl:   •  bisoprolol (ZEBeta) 5 MG tablet, Take 5 mg by mouth Daily., Disp: , Rfl: 2  •  CHONDROITIN SULFATE PO, Take 1,200 mg by mouth Daily., Disp: , Rfl:   •  Fish Oil-Cholecalciferol (FISH OIL + D3 PO), Take 1,200 mg by mouth., Disp: , Rfl:   •  Jardiance 10 MG tablet tablet, Take 10 mg by mouth Daily., Disp: , Rfl:   •  LEVEMIR FLEXTOUCH 100 UNIT/ML injection, 2 (Two) Times a Day. Sliding scale, Disp: , Rfl: 2  •  lisinopril (PRINIVIL,ZESTRIL) 5 MG tablet, Take 5 mg by mouth Daily., Disp: , Rfl: 2  •  metFORMIN (GLUCOPHAGE) 500 MG tablet, Take 1,000 mg by mouth 2 (Two) Times a Day With Meals. 2 tablets twice daily, Disp: , Rfl:   •  NOVOLOG FLEXPEN 100 UNIT/ML solution pen-injector sc pen, Sliding scale, Disp: , Rfl: 2  •  Trulicity 1.5 MG/0.5ML solution pen-injector, Inject 0.75 mg under the skin into the appropriate area as directed Every 7 (Seven) Days., Disp: , Rfl:   •  vitamin B-6 (PYRIDOXINE) 100 MG tablet, Take 100 mg by mouth Daily., Disp: , Rfl:   •  vitamin E 100  "UNIT capsule, Take 100 Units by mouth Daily. otc per ortho hand specialist, Disp: , Rfl:     Vitals:    01/16/23 1054   BP: 122/68   BP Location: Left arm   Patient Position: Sitting   Cuff Size: Adult   Pulse: 68   SpO2: 98%   Weight: 121 kg (266 lb)   Height: 185.4 cm (73\")     Body mass index is 35.09 kg/m².    PHYSICAL EXAM:    General Appearance:   · well developed  · well nourished  HENT:   · oropharynx moist  · lips not cyanotic  Neck:  · thyroid not enlarged  · supple  Respiratory:  · no respiratory distress  · normal breath sounds  · no rales  Cardiovascular:  · no jugular venous distention  · regular rhythm  · apical impulse normal  · S1 normal, S2 normal  · no S3, no S4   · no murmur  · no rub, no thrill  · carotid pulses normal; no bruit  · pedal pulses normal  · lower extremity edema: none    Gastrointestinal:   · bowel sounds normal  · non-tender  · no hepatomegaly, no splenomegaly  Musculoskeletal:  · no clubbing of fingers.   · normocephalic, head atraumatic  Skin:   · warm, dry  Psychiatric:  · judgement and insight appropriate  · normal mood and affect    RESULTS:   Procedures    Results for orders placed during the hospital encounter of 04/16/21    Adult Transthoracic Echo Complete W/ Cont if Necessary Per Protocol    Interpretation Summary  · Left ventricular ejection fraction appears to be 61 - 65%. Left ventricular systolic function is normal.  · Left ventricular diastolic function was normal.        Labs:  CBC 2/24/21   WBC 9   RBC 4.56   Hemoglobin 14.6   Hematocrit 43.4   Platelet 280       CMP 2/24/21   Glucose 112 H   BUN 20   Creatinine 0.96   Glomerular Filtration Rate 81   BUN/Creatinine Ratio NA   Sodium 141   Potassium 4.4   Chloride 106   Carbon Dioxide 27   Calcium 9.1   AST 20   ALT 20       LIPID 2/24/21   Total 143   HDL 28 L   Triglycerides 170 H   LDL 88       Hemoglobin A1C 5.6       TSH 1.58   PSA 0.7         Lab Results   Component Value Date    CHOL 80 04/28/2021    TRIG " 106 04/28/2021    HDL 32 (L) 04/28/2021    LDL 28 04/28/2021    AST 26 02/10/2017    ALT 31 02/10/2017     Lab Results   Component Value Date    HGBA1C 6.10 (H) 04/28/2021     No components found for: CREATINININE  eGFR Non  Amer   Date Value Ref Range Status   04/28/2021 70 >60 mL/min/1.73 Final   02/10/2017 98 >60 mL/min/1.73 Final       Most recent PCP note, imaging tests, and labs reviewed.    ASSESSMENT:  Problem List Items Addressed This Visit        Cardiac and Vasculature    Coronary artery disease of native artery of native heart with stable angina pectoris (HCC) - Primary    Overview     2003: PCI of the mid RCA with a 4.0 mm stent by Dr. Nichols and 2003, occluded distal circumflex with collaterals, 40% diagonal lesion, 50% PDA lesion.  4/20/2021: [facility]: Patent mid RCA stent with minimal 10-20% ISR, small caliber PDA superior branch 100% , small caliber circumflex gives rise to 1 small caliber OM which is patent, mid/distal circumflex is 100% occluded with right to left collaterals.  This was known from previous cath in 2003, LAD moderate caliber, first diagonal moderate caliber first diagonal has ostial 40-50% stenosis, mid LAD and acute bend has approximately 30-40% stenosis otherwise normal.  LVEDP 2 mmHg.         Essential hypertension    Hyperlipidemia LDL goal <70    KRUEGER (dyspnea on exertion)    Overview     4/16/21 Stress pet small apical infarct with mild coby-infarct ischemia. Possible small region of mild ischemia in the distal left circumflex    4/16/21 Echo LVEF 61-65%, normal diastolic function            Endocrine and Metabolic    Type 2 diabetes mellitus with hyperglycemia, without long-term current use of insulin ([facility])    Relevant Medications    Jardiance 10 MG tablet tablet       PLAN:    1.  Dyspnea on exertion:  Stress/PET 4/16/21 reviewed, abnormal with possible small region of mild ischemia in the distal left circumflex  [facility] 4/20/21, reviewed films in person with  patient, circumflex  and PLV , medical therapy for now.  No indication for high risk intervention.    Echocardiogram 4/16/21 reviewed, normal  The patient was advised to call 911 if chest pain worsens or persist despite nitroglycerin or rest.     2.  CAD:  Continue atorvastatin 10 mg daily  Lipids well controlled, LDL mid 30s  Continue aspirin and bisoprolol  Continue Trulicity and Jardiance  Continue exercise, asymptomatic currently    3.  Morbid obesity:  Congratulated him on 90 pound weight loss since 2020  Continue exercise and low caloric intake  GLP-1 agonist certainly helping as well.    4.  Hypertension:  Long-term goal blood pressure is 130/80  Continue lisinopril and bisoprolol    5.  Hyperlipidemia:  LDL goal less than 70  Doing well on atorvastatin 10 mg daily    6.  Diabetes mellitus type 2:  Agree with Trulicity and Jardiance for cardiovascular protection      Return to clinic in 9 months, or sooner as needed.    Thank you for the opportunity to share in the care of your patient; please do not hesitate to call me with any questions.     Damon Kelley MD, [facility]  Office: [phone]  [facility] [place] [address]    01/16/23

## 2023-11-06 ENCOUNTER — OFFICE VISIT (OUTPATIENT)
Dept: CARDIOLOGY | Facility: CLINIC | Age: 68
End: 2023-11-06
Payer: MEDICARE

## 2023-11-06 VITALS
HEIGHT: 73 IN | WEIGHT: 262 LBS | BODY MASS INDEX: 34.72 KG/M2 | DIASTOLIC BLOOD PRESSURE: 66 MMHG | HEART RATE: 69 BPM | SYSTOLIC BLOOD PRESSURE: 134 MMHG | OXYGEN SATURATION: 97 %

## 2023-11-06 DIAGNOSIS — R06.09 DOE (DYSPNEA ON EXERTION): ICD-10-CM

## 2023-11-06 DIAGNOSIS — E11.65 TYPE 2 DIABETES MELLITUS WITH HYPERGLYCEMIA, WITHOUT LONG-TERM CURRENT USE OF INSULIN: ICD-10-CM

## 2023-11-06 DIAGNOSIS — E66.01 MORBID OBESITY WITH BMI OF 45.0-49.9, ADULT: ICD-10-CM

## 2023-11-06 DIAGNOSIS — E78.5 HYPERLIPIDEMIA LDL GOAL <70: ICD-10-CM

## 2023-11-06 DIAGNOSIS — I10 ESSENTIAL HYPERTENSION: ICD-10-CM

## 2023-11-06 DIAGNOSIS — I25.118 CORONARY ARTERY DISEASE OF NATIVE ARTERY OF NATIVE HEART WITH STABLE ANGINA PECTORIS: Primary | ICD-10-CM

## 2023-11-06 PROCEDURE — 3078F DIAST BP <80 MM HG: CPT | Performed by: INTERNAL MEDICINE

## 2023-11-06 PROCEDURE — 3075F SYST BP GE 130 - 139MM HG: CPT | Performed by: INTERNAL MEDICINE

## 2023-11-06 PROCEDURE — 99214 OFFICE O/P EST MOD 30 MIN: CPT | Performed by: INTERNAL MEDICINE

## 2023-11-06 NOTE — PROGRESS NOTES
OFFICE VISIT  NOTE  [facility] CARDIOLOGY      Name: Agusto Cullen    Date: 2023  MRN:  [id number]  :  1955      REFERRING/PRIMARY PROVIDER:  Rachel Raymond MD    Chief Complaint   Patient presents with    Coronary Artery Disease       HPI: Agusto Cullen is a 68 y.o. male who presents today for follow-up for history of CAD.  History of PCI of the mid RCA with a 4.0 mm stent by Dr. Nichols and , occluded distal circumflex with collaterals, 40% diagonal lesion, 50% PDA lesion.  Most recent cath 2021, showed known left circumflex , PLV , minimal disease of LAD and patent RCA stents.  He started on Trulicity after cath.  He has diabetes mellitus type 2, hypertension, hyperlipidemia, and morbid obesity.  Doing well from cardiovascular standpoint, denies chest pain palpitations or shortness of breath.  Still exercising, riding an exercise bike 4 to 5 days per and staying active in his garden.  Has lost over 90 pounds over the past 2 years.  Weight loss has plateaued but overall doing well no chest pain or shortness of breath.    Past Medical History:   Diagnosis Date    Diabetes mellitus     Gout     Hyperlipidemia     Hypertension     Sleep apnea     Vision loss        Past Surgical History:   Procedure Laterality Date    CARDIAC CATHETERIZATION Left 2021    Procedure: Left Heart Cath +/- CBI;  Surgeon: Damon Kelley MD;  Location: [facility] INVASIVE LOCATION;  Service: Cardiovascular;  Laterality: Left;    CAROTID STENT      CHOLECYSTECTOMY      MANDIBLE SURGERY         Social History     Socioeconomic History    Marital status:    Tobacco Use    Smoking status: Never    Smokeless tobacco: Never   Vaping Use    Vaping Use: Never used   Substance and Sexual Activity    Alcohol use: Yes     Comment: 3 glasses of wine per year    Drug use: Never    Sexual activity: Defer       Family History   Problem Relation Age of Onset    Stroke Mother     Heart  disease Brother 49    Dementia Maternal Uncle     Heart disease Maternal Grandfather     Cancer Father         ROS:   Constitutional no fever,  no weight loss   Skin no rash, no subcutaneous nodules   Otolaryngeal no difficulty swallowing   Cardiovascular See HPI   Pulmonary no cough, no sputum production   Gastrointestinal no constipation, no diarrhea   Genitourinary no dysuria, no hematuria   Hematologic no easy bruisability, no abnormal bleeding   Musculoskeletal no muscle pain   Neurologic no dizziness, no falls         Allergies   Allergen Reactions    Penicillins Unknown - Low Severity     unknown         Current Outpatient Medications:     allopurinol (ZYLOPRIM) 300 MG tablet, Take 1 tablet by mouth Daily., Disp: , Rfl: 0    aspirin  MG tablet, Take 1 tablet by mouth Daily., Disp: , Rfl:     atorvastatin (LIPITOR) 10 MG tablet, Take 1 tablet by mouth Daily., Disp: , Rfl:     bisoprolol (ZEBeta) 5 MG tablet, Take 1 tablet by mouth Daily., Disp: , Rfl: 2    CHONDROITIN SULFATE PO, Take 1,200 mg by mouth Daily., Disp: , Rfl:     Dulaglutide 3 MG/0.5ML solution pen-injector, Inject  under the skin into the appropriate area as directed Every 7 (Seven) Days., Disp: , Rfl:     Fish Oil-Cholecalciferol (FISH OIL + D3 PO), Take 1,200 mg by mouth., Disp: , Rfl:     Jardiance 10 MG tablet tablet, Take 1 tablet by mouth Daily., Disp: , Rfl:     LEVEMIR FLEXTOUCH 100 UNIT/ML injection, 2 (Two) Times a Day. Sliding scale, Disp: , Rfl: 2    lisinopril (PRINIVIL,ZESTRIL) 5 MG tablet, Take 1 tablet by mouth Daily., Disp: , Rfl: 2    metFORMIN (GLUCOPHAGE) 500 MG tablet, Take 2 tablets by mouth 2 (Two) Times a Day With Meals. 2 tablets twice daily, Disp: , Rfl:     NOVOLOG FLEXPEN 100 UNIT/ML solution pen-injector sc pen, Sliding scale, Disp: , Rfl: 2    vitamin B-6 (PYRIDOXINE) 100 MG tablet, Take 1 tablet by mouth Daily., Disp: , Rfl:     vitamin E 100 UNIT capsule, Take 1 capsule by mouth Daily. otc per ortho hand  "specialist, Disp: , Rfl:     Vitals:    11/06/23 1136   BP: 134/66   BP Location: Left arm   Patient Position: Sitting   Pulse: 69   SpO2: 97%   Weight: 119 kg (262 lb)   Height: 184.2 cm (72.5\")     Body mass index is 35.05 kg/m².    PHYSICAL EXAM:    General Appearance:   well developed  well nourished  HENT:   oropharynx moist  lips not cyanotic  Neck:  thyroid not enlarged  supple  Respiratory:  no respiratory distress  normal breath sounds  no rales  Cardiovascular:  no jugular venous distention  regular rhythm  apical impulse normal  S1 normal, S2 normal  no S3, no S4   no murmur  no rub, no thrill  carotid pulses normal; no bruit  pedal pulses normal  lower extremity edema: none    Gastrointestinal:   bowel sounds normal  non-tender  no hepatomegaly, no splenomegaly  Musculoskeletal:  no clubbing of fingers.   normocephalic, head atraumatic  Skin:   warm, dry  Psychiatric:  judgement and insight appropriate  normal mood and affect    RESULTS:   Procedures    Results for orders placed during the hospital encounter of 04/16/21    Adult Transthoracic Echo Complete W/ Cont if Necessary Per Protocol    Interpretation Summary  · Left ventricular ejection fraction appears to be 61 - 65%. Left ventricular systolic function is normal.  · Left ventricular diastolic function was normal.        Labs:  CBC 2/24/21   WBC 9   RBC 4.56   Hemoglobin 14.6   Hematocrit 43.4   Platelet 280       CMP 2/24/21   Glucose 112 H   BUN 20   Creatinine 0.96   Glomerular Filtration Rate 81   BUN/Creatinine Ratio NA   Sodium 141   Potassium 4.4   Chloride 106   Carbon Dioxide 27   Calcium 9.1   AST 20   ALT 20       LIPID 2/24/21   Total 143   HDL 28 L   Triglycerides 170 H   LDL 88       Hemoglobin A1C 5.6       TSH 1.58   PSA 0.7         Lab Results   Component Value Date    CHOL 80 04/28/2021    TRIG 106 04/28/2021    HDL 32 (L) 04/28/2021    LDL 28 04/28/2021    AST 26 02/10/2017    ALT 31 02/10/2017     Lab Results   Component Value " "Date    HGBA1C 6.10 (H) 04/28/2021     No components found for: \"CREATINININE\"  eGFR Non  Amer   Date Value Ref Range Status   04/28/2021 70 >60 mL/min/1.73 Final   02/10/2017 98 >60 mL/min/1.73 Final       Most recent PCP note, imaging tests, and labs reviewed.    ASSESSMENT:  Problem List Items Addressed This Visit       Coronary artery disease of native artery of native heart with stable angina pectoris - Primary    Overview     2003: PCI of the mid RCA with a 4.0 mm stent by Dr. Nichols and 2003, occluded distal circumflex with collaterals, 40% diagonal lesion, 50% PDA lesion.  4/20/2021: [facility]: Patent mid RCA stent with minimal 10-20% ISR, small caliber PDA superior branch 100% , small caliber circumflex gives rise to 1 small caliber OM which is patent, mid/distal circumflex is 100% occluded with right to left collaterals.  This was known from previous cath in 2003, LAD moderate caliber, first diagonal moderate caliber first diagonal has ostial 40-50% stenosis, mid LAD and acute bend has approximately 30-40% stenosis otherwise normal.  LVEDP 2 mmHg.         Type 2 diabetes mellitus with hyperglycemia, without long-term current use of insulin    Essential hypertension    Morbid obesity with BMI of 45.0-49.9, adult    Hyperlipidemia LDL goal <70    KRUEGER (dyspnea on exertion)    Overview     4/16/21 Stress pet small apical infarct with mild coby-infarct ischemia. Possible small region of mild ischemia in the distal left circumflex    4/16/21 Echo LVEF 61-65%, normal diastolic function            PLAN:    1.  Dyspnea on exertion:  Stress/PET 4/16/21 reviewed, abnormal with possible small region of mild ischemia in the distal left circumflex  [facility] 4/20/21, reviewed films in person with patient, circumflex  and PLV , medical therapy for now.  No indication for high risk intervention.    Echocardiogram 4/16/21 reviewed, normal  The patient was advised to call 911 if chest pain worsens or persist " despite nitroglycerin or rest.     2.  CAD:  Continue atorvastatin 10 mg daily  Lipids well controlled, LDL mid 30s  Continue aspirin and bisoprolol  Continue Trulicity and Jardiance  Continue exercise, asymptomatic currently    3.  Morbid obesity:  Congratulated him on 90 pound weight loss since 2020  Continue exercise and low caloric intake  GLP-1 agonist certainly helping as well.    4.  Hypertension:  Long-term goal blood pressure is 130/80  Continue lisinopril and bisoprolol    5.  Hyperlipidemia:  LDL goal less than 70  Doing well on atorvastatin 10 mg daily    6.  Diabetes mellitus type 2:  Agree with Trulicity and Jardiance for cardiovascular protection      Return to clinic in 12 months, or sooner as needed.    Thank you for the opportunity to share in the care of your patient; please do not hesitate to call me with any questions.     Damon Kelley MD, [facility]  Office: [phone] [address] [place] [address]    11/06/23

## 2024-11-18 ENCOUNTER — OFFICE VISIT (OUTPATIENT)
Dept: CARDIOLOGY | Facility: CLINIC | Age: 69
End: 2024-11-18
Payer: MEDICARE

## 2024-11-18 VITALS
OXYGEN SATURATION: 96 % | HEART RATE: 78 BPM | SYSTOLIC BLOOD PRESSURE: 126 MMHG | HEIGHT: 73 IN | DIASTOLIC BLOOD PRESSURE: 74 MMHG | BODY MASS INDEX: 34.22 KG/M2 | WEIGHT: 258.2 LBS

## 2024-11-18 DIAGNOSIS — I10 ESSENTIAL HYPERTENSION: Chronic | ICD-10-CM

## 2024-11-18 DIAGNOSIS — E78.5 HYPERLIPIDEMIA LDL GOAL <70: Chronic | ICD-10-CM

## 2024-11-18 DIAGNOSIS — I25.118 CORONARY ARTERY DISEASE OF NATIVE ARTERY OF NATIVE HEART WITH STABLE ANGINA PECTORIS: Primary | Chronic | ICD-10-CM

## 2024-11-18 PROCEDURE — 3078F DIAST BP <80 MM HG: CPT | Performed by: INTERNAL MEDICINE

## 2024-11-18 PROCEDURE — 3074F SYST BP LT 130 MM HG: CPT | Performed by: INTERNAL MEDICINE

## 2024-11-18 PROCEDURE — 99214 OFFICE O/P EST MOD 30 MIN: CPT | Performed by: INTERNAL MEDICINE

## 2024-11-18 NOTE — PROGRESS NOTES
OFFICE VISIT  NOTE  [facility] CARDIOLOGY      Name: Agusto Cullen    Date: 2024  MRN:  [id number]  :  1955      REFERRING/PRIMARY PROVIDER:  Rachel Raymond MD    Chief Complaint   Patient presents with    Coronary artery disease of native artery of native heart wi       HPI: Agusto Cullen is a 69 y.o. male who presents for CAD.  History of PCI of the mid RCA with a 4.0 mm stent by Dr. Nichols in , occluded distal circumflex with collaterals, 40% diagonal lesion, 50% PDA lesion.  Most recent cath 2021, showed known left circumflex , PLV , minimal disease of LAD and patent RCA stents.  He started on Trulicity after cath.  He has diabetes mellitus type 2, hypertension, hyperlipidemia, and morbid obesity.  Has lost 100 pounds over the past 2 years.  Staying active this summer out in his yard exercising and gardening no chest pain or shortness of breath.    Past Medical History:   Diagnosis Date    Diabetes mellitus     Gout     Hyperlipidemia     Hypertension     Sleep apnea     Vision loss        Past Surgical History:   Procedure Laterality Date    CARDIAC CATHETERIZATION Left 2021    Procedure: Left Heart Cath +/- CBI;  Surgeon: Damon Kelley MD;  [facility];  Service: Cardiovascular;  Laterality: Left;    CAROTID STENT      CHOLECYSTECTOMY      MANDIBLE SURGERY         Social History     Socioeconomic History    Marital status:    Tobacco Use    Smoking status: Never    Smokeless tobacco: Never   Vaping Use    Vaping status: Never Used   Substance and Sexual Activity    Alcohol use: Yes     Comment: 3 glasses of wine per year    Drug use: Never    Sexual activity: Defer       Family History   Problem Relation Age of Onset    Stroke Mother     Heart disease Brother 49    Dementia Maternal Uncle     Heart disease Maternal Grandfather     Cancer Father         ROS:   Constitutional no fever,  no weight loss   Skin no rash,  "no subcutaneous nodules   Otolaryngeal no difficulty swallowing   Cardiovascular See HPI   Pulmonary no cough, no sputum production   Gastrointestinal no constipation, no diarrhea   Genitourinary no dysuria, no hematuria   Hematologic no easy bruisability, no abnormal bleeding   Musculoskeletal no muscle pain   Neurologic no dizziness, no falls         Allergies   Allergen Reactions    Penicillins Unknown - Low Severity     unknown         Current Outpatient Medications:     allopurinol (ZYLOPRIM) 300 MG tablet, Take 1 tablet by mouth Daily., Disp: , Rfl: 0    aspirin  MG tablet, Take 1 tablet by mouth Daily., Disp: , Rfl:     atorvastatin (LIPITOR) 10 MG tablet, Take 1 tablet by mouth Daily., Disp: , Rfl:     bisoprolol (ZEBeta) 5 MG tablet, Take 1 tablet by mouth Daily., Disp: , Rfl: 2    CHONDROITIN SULFATE PO, Take 1,200 mg by mouth Daily., Disp: , Rfl:     Dulaglutide 3 MG/0.5ML solution pen-injector, Inject  under the skin into the appropriate area as directed Every 7 (Seven) Days., Disp: , Rfl:     Fish Oil-Cholecalciferol (FISH OIL + D3 PO), Take 1,200 mg by mouth., Disp: , Rfl:     Jardiance 10 MG tablet tablet, Take 1 tablet by mouth Daily., Disp: , Rfl:     LEVEMIR FLEXTOUCH 100 UNIT/ML injection, 2 (Two) Times a Day. Sliding scale, Disp: , Rfl: 2    lisinopril (PRINIVIL,ZESTRIL) 5 MG tablet, Take 1 tablet by mouth Daily., Disp: , Rfl: 2    metFORMIN (GLUCOPHAGE) 500 MG tablet, Take 2 tablets by mouth 2 (Two) Times a Day With Meals. 2 tablets twice daily, Disp: , Rfl:     NOVOLOG FLEXPEN 100 UNIT/ML solution pen-injector sc pen, Sliding scale, Disp: , Rfl: 2    vitamin B-6 (PYRIDOXINE) 100 MG tablet, Take 1 tablet by mouth Daily., Disp: , Rfl:     vitamin E 100 UNIT capsule, Take 1 capsule by mouth Daily. otc per ortho hand specialist, Disp: , Rfl:     Vitals:    11/18/24 1209   BP: 126/74   Pulse: 78   SpO2: 96%   Weight: 117 kg (258 lb 3.2 oz)   Height: 185.4 cm (73\")       Body mass index is " "34.07 kg/m².    PHYSICAL EXAM:    General Appearance:   well developed  well nourished  HENT:   oropharynx moist  lips not cyanotic  Neck:  thyroid not enlarged  supple  Respiratory:  no respiratory distress  normal breath sounds  no rales  Cardiovascular:  no jugular venous distention  regular rhythm  apical impulse normal  S1 normal, S2 normal  no S3, no S4   no murmur  no rub, no thrill  carotid pulses normal; no bruit  pedal pulses normal  lower extremity edema: none    Gastrointestinal:   bowel sounds normal  non-tender  no hepatomegaly, no splenomegaly  Musculoskeletal:  no clubbing of fingers.   normocephalic, head atraumatic  Skin:   warm, dry  Psychiatric:  judgement and insight appropriate  normal mood and affect    RESULTS:   Procedures    Results for orders placed during the hospital encounter of 04/16/21    Adult Transthoracic Echo Complete W/ Cont if Necessary Per Protocol    Interpretation Summary  · Left ventricular ejection fraction appears to be 61 - 65%. Left ventricular systolic function is normal.  · Left ventricular diastolic function was normal.        Labs:  CBC 2/24/21   WBC 9   RBC 4.56   Hemoglobin 14.6   Hematocrit 43.4   Platelet 280       CMP 2/24/21   Glucose 112 H   BUN 20   Creatinine 0.96   Glomerular Filtration Rate 81   BUN/Creatinine Ratio NA   Sodium 141   Potassium 4.4   Chloride 106   Carbon Dioxide 27   Calcium 9.1   AST 20   ALT 20       LIPID 2/24/21   Total 143   HDL 28 L   Triglycerides 170 H   LDL 88       Hemoglobin A1C 5.6       TSH 1.58   PSA 0.7         Lab Results   Component Value Date    CHOL 80 04/28/2021    TRIG 106 04/28/2021    HDL 32 (L) 04/28/2021    LDL 28 04/28/2021    AST 26 02/10/2017    ALT 31 02/10/2017     Lab Results   Component Value Date    HGBA1C 6.10 (H) 04/28/2021     No components found for: \"CREATINININE\"  eGFR Non  Amer   Date Value Ref Range Status   04/28/2021 70 >60 mL/min/1.73 Final   02/10/2017 98 >60 mL/min/1.73 Final       Most " recent PCP note, imaging tests, and labs reviewed.    ASSESSMENT:  Problem List Items Addressed This Visit       Coronary artery disease of native artery of native heart with stable angina pectoris - Primary (Chronic)    Overview     2003: PCI of the mid RCA with a 4.0 mm stent by Dr. Nichols and 2003, occluded distal circumflex with collaterals, 40% diagonal lesion, 50% PDA lesion.  4/20/2021: [facility]: Patent mid RCA stent with minimal 10-20% ISR, small caliber PDA superior branch 100% , small caliber circumflex gives rise to 1 small caliber OM which is patent, mid/distal circumflex is 100% occluded with right to left collaterals.  This was known from previous cath in 2003, LAD moderate caliber, first diagonal moderate caliber first diagonal has ostial 40-50% stenosis, mid LAD and acute bend has approximately 30-40% stenosis otherwise normal.  LVEDP 2 mmHg.         Essential hypertension (Chronic)    Hyperlipidemia LDL goal <70 (Chronic)       PLAN:    1.  Dyspnea on exertion:  Stress/PET 4/16/21 reviewed, abnormal with possible small region of mild ischemia in the distal left circumflex  [facility] 4/20/21, reviewed films in person with patient, circumflex  and PLV , medical therapy for now.  No indication for high risk intervention.    Echocardiogram 4/16/21 reviewed, normal  The patient was advised to call 911 if chest pain worsens or persist despite nitroglycerin or rest.     2.  CAD:  Continue atorvastatin 10 mg daily  Lipids well controlled, LDL mid 30s historically.  Continue aspirin and bisoprolol  Continue Trulicity and Jardiance  Continue exercise, asymptomatic currently    3.  Morbid obesity:  Congratulated him on 100 pound weight loss since 2020  Continue exercise and low caloric intake  GLP-1 agonist certainly helping as well.    4.  Hypertension:  Long-term goal blood pressure is 130/80  Continue lisinopril and bisoprolol    5.  Hyperlipidemia:  LDL goal less than 70  Doing well on atorvastatin 10  mg daily    6.  Diabetes mellitus type 2:  Agree with Trulicity and Jardiance for cardiovascular protection      Return to clinic in 12 months, or sooner as needed.    Thank you for the opportunity to share in the care of your patient; please do not hesitate to call me with any questions.     Damon Kelley MD, Located within [facility]  Office: [phone] [address] [place] [address]    11/18/24

## (undated) DEVICE — MODEL AT P65, P/N 701554-001KIT CONTENTS: HAND CONTROLLER, 3-WAY HIGH-PRESSURE STOPCOCK WITH ROTATING END AND PREMIUM HIGH-PRESSURE TUBING: Brand: ANGIOTOUCH® KIT

## (undated) DEVICE — GLIDESHEATH SLENDER STAINLESS STEEL KIT: Brand: GLIDESHEATH SLENDER

## (undated) DEVICE — PK CATH CARD 10

## (undated) DEVICE — DEV COMP RAD PRELUDESYNC 24CM

## (undated) DEVICE — GW PERIPH GUIDERIGHT STD/EXCHNG/J/TIP SS 0.035IN 5X260CM

## (undated) DEVICE — MODEL BT2000 P/N 700287-012KIT CONTENTS: MANIFOLD WITH SALINE AND CONTRAST PORTS, SALINE TUBING WITH SPIKE AND HAND SYRINGE, TRANSDUCER: Brand: BT2000 AUTOMATED MANIFOLD KIT

## (undated) DEVICE — CATH DIAG EXPO .045 FL3  5F 100CM

## (undated) DEVICE — CATH DIAG EXPO M/ PK 5F FL4/FR4 PIG